# Patient Record
Sex: MALE | Race: ASIAN | NOT HISPANIC OR LATINO | URBAN - METROPOLITAN AREA
[De-identification: names, ages, dates, MRNs, and addresses within clinical notes are randomized per-mention and may not be internally consistent; named-entity substitution may affect disease eponyms.]

---

## 2020-03-21 ENCOUNTER — EMERGENCY (EMERGENCY)
Facility: HOSPITAL | Age: 45
LOS: 1 days | Discharge: HOME | End: 2020-03-21
Attending: EMERGENCY MEDICINE
Payer: COMMERCIAL

## 2020-03-21 VITALS
WEIGHT: 164.91 LBS | OXYGEN SATURATION: 98 % | HEART RATE: 95 BPM | RESPIRATION RATE: 16 BRPM | TEMPERATURE: 99 F | HEIGHT: 66 IN | DIASTOLIC BLOOD PRESSURE: 88 MMHG | SYSTOLIC BLOOD PRESSURE: 137 MMHG

## 2020-03-21 PROCEDURE — 99284 EMERGENCY DEPT VISIT MOD MDM: CPT

## 2020-03-21 PROCEDURE — 99282 EMERGENCY DEPT VISIT SF MDM: CPT

## 2020-03-21 RX ORDER — POLYETHYLENE GLYCOL 3350 17 G/17G
17 POWDER, FOR SOLUTION ORAL
Qty: 510 | Refills: 0
Start: 2020-03-21 | End: 2020-04-19

## 2020-03-21 RX ORDER — MULTIVIT WITH MIN/MFOLATE/K2 340-15/3 G
300 POWDER (GRAM) ORAL
Qty: 300 | Refills: 0
Start: 2020-03-21

## 2020-03-21 NOTE — ED PROVIDER NOTE - CLINICAL SUMMARY MEDICAL DECISION MAKING FREE TEXT BOX
44 yr old male with no hx presents to ed c/o constipation x 4 days.  straining and noticed streak of blood. hx of constipation. no ac use. no fever, no chills, no n/v.  abd cramping. no hx of colon ca. no fever, no travel or alcohol use. no rectal instrumentation  \  rx miralax, mag citrate, fleet enema - avoid straining. eat veggies and hydrate, see gi

## 2020-03-21 NOTE — ED PROVIDER NOTE - PATIENT PORTAL LINK FT
You can access the FollowMyHealth Patient Portal offered by [facility] by registering at the following website: [email or web address]. By joining [facility]’s FollowMyHealth portal, you will also be able to view your health information using other applications (apps) compatible with our system.

## 2020-03-21 NOTE — ED ADULT NURSE NOTE - CHIEF COMPLAINT QUOTE
c/o constipation x 3 days with LUQ stomach pain, i've been drinking prune juice and when I pooped I saw some blood around 4pm

## 2020-03-21 NOTE — ED PROVIDER NOTE - OBJECTIVE STATEMENT
44 yr old male with no hx presents to ed c/o constipation x 4 days.  straining and noticed streak of blood. hx of constipation. no ac use. no fever, no chills, no n/v.  abd cramping. no hx of colon ca. no fever, no travel or alcohol use. no rectal instrumentation

## 2020-08-09 PROBLEM — Z00.00 ENCOUNTER FOR PREVENTIVE HEALTH EXAMINATION: Status: ACTIVE | Noted: 2020-08-09

## 2020-08-14 ENCOUNTER — APPOINTMENT (OUTPATIENT)
Dept: GASTROENTEROLOGY | Facility: CLINIC | Age: 45
End: 2020-08-14
Payer: COMMERCIAL

## 2020-08-14 VITALS
BODY MASS INDEX: 24.48 KG/M2 | WEIGHT: 156 LBS | SYSTOLIC BLOOD PRESSURE: 130 MMHG | TEMPERATURE: 98.2 F | HEIGHT: 67 IN | OXYGEN SATURATION: 95 % | HEART RATE: 67 BPM | DIASTOLIC BLOOD PRESSURE: 85 MMHG

## 2020-08-14 DIAGNOSIS — R19.8 OTHER SPECIFIED SYMPTOMS AND SIGNS INVOLVING THE DIGESTIVE SYSTEM AND ABDOMEN: ICD-10-CM

## 2020-08-14 DIAGNOSIS — Z12.11 ENCOUNTER FOR SCREENING FOR MALIGNANT NEOPLASM OF COLON: ICD-10-CM

## 2020-08-14 DIAGNOSIS — Z83.71 ENCOUNTER FOR SCREENING FOR MALIGNANT NEOPLASM OF COLON: ICD-10-CM

## 2020-08-14 DIAGNOSIS — Z82.49 FAMILY HISTORY OF ISCHEMIC HEART DISEASE AND OTHER DISEASES OF THE CIRCULATORY SYSTEM: ICD-10-CM

## 2020-08-14 DIAGNOSIS — R73.03 PREDIABETES.: ICD-10-CM

## 2020-08-14 DIAGNOSIS — Z78.9 OTHER SPECIFIED HEALTH STATUS: ICD-10-CM

## 2020-08-14 DIAGNOSIS — Z87.39 PERSONAL HISTORY OF OTHER DISEASES OF THE MUSCULOSKELETAL SYSTEM AND CONNECTIVE TISSUE: ICD-10-CM

## 2020-08-14 DIAGNOSIS — Z91.89 OTHER SPECIFIED PERSONAL RISK FACTORS, NOT ELSEWHERE CLASSIFIED: ICD-10-CM

## 2020-08-14 PROCEDURE — 99203 OFFICE O/P NEW LOW 30 MIN: CPT

## 2020-08-14 NOTE — HISTORY OF PRESENT ILLNESS
[None] : had no significant interval events [Nausea] : denies nausea [Vomiting] : denies vomiting [Yellow Skin Or Eyes (Jaundice)] : denies jaundice [Rectal Pain] : denies rectal pain [Wt Loss ___ Lbs] : recent [unfilled] ~Upound(s) weight loss [Heartburn] : heartburn [Diarrhea] : diarrhea [Constipation] : constipation [Abdominal Pain] : abdominal pain [GERD] : gastroesophageal reflux disease [Abdominal Swelling] : abdominal swelling [Wt Gain ___ Lbs] : no recent weight gain [Hiatus Hernia] : no hiatus hernia [Peptic Ulcer Disease] : no peptic ulcer disease [Pancreatitis] : no pancreatitis [Cholelithiasis] : no cholelithiasis [Kidney Stone] : no kidney stone [Inflammatory Bowel Disease] : no inflammatory bowel disease [Diverticulitis] : no diverticulitis [Irritable Bowel Syndrome] : no irritable bowel syndrome [Alcohol Abuse] : no alcohol abuse [Malignancy] : no malignancy [Abdominal Surgery] : no abdominal surgery [Appendectomy] : no appendectomy [Cholecystectomy] : no cholecystectomy [de-identified] : The patient is a 44-year-old [facility] male with past medical history significant for hypercholesterolemia and borderline diabetes mellitus who was referred to my office by Dr. Yeimy Barnard for abdominal pain, dyspepsia and gastroesophageal reflux disease. The patient also admits to having alternating diarrhea/constipation, change in bowel habits, change in caliber of stool and lower GI bleeding.  The patient haso has a family history of colonic polyps.  I was asked to render an opinion for consultation for the above complaints.   The patient states that he is feeling uncomfortable x 5 months.  The patient complains of abdominal pain.  The patient describes the abdominal pain as a crampy, intermittent midepigastric and left upper quadrant discomfort that occasionally radiates to the back.  The abdominal pain is worse with meals and improves with passing gas or having a bowel movement.  The abdominal pain is described as being mild in nature.  The abdominal pain occurs at night and in the morning.  The abdominal pain can occur at any time.   The abdominal pain never has awakened the patient from sleep.  The abdominal pain is not relieved with any medications.  The abdominal pain is associated with abdominal gas and bloating.  The patient denies any nausea or vomiting.  The patient complains of gastroesophageal reflux disease but denies any dysphagia. The gastroesophageal reflux disease is worse after meals and late at night and in the early morning. The patient denied taking medications for the gastroesophageal reflux disease such as proton pump inhibitors, H2 blockers or antacids.  The patient denies any atypical chest pain, shortness of breath or palpitations.  The patient denies any diaphoresis. The patient complains of alternating diarrhea/constipation.  The patient has 1 bowel movement every 1 to 3 days.  The patient complains of a change in bowel habits.  The patient complains of a change in caliber of stool.   The diarrhea is described as soft to watery in nature.  The patient admits to having occasional mucus discharge with the bowel movements.  The patient complains of lower GI bleeding but denies any melena or hematemesis.  The lower GI bleeding is associated with diarrhea and constipation and internal hemorrhoids.  The patient denies any rectal pain or rectal pruritus. The patient complains of weight loss but denies any anorexia.  The patient admits to losing 10 pounds over the past 5 months.  He denies any fevers or chills.  The patient denies any jaundice or pruritus.  The patient complains of occasional lower back pain.  The patient denies ever having a prior upper endoscopy and colonoscopy performed by another gastroenterologist.  The patient admits to a family history of GI problems.  The patient’s maternal aunt had ?liver vs. gallbladder issue that required surgery.  The patient’s sister had a history of colonic polyps age 45. [de-identified] : (+) prior smoking PRN stopped x 10 years, (-) ETOH, (-) IVDA\par

## 2020-08-14 NOTE — REVIEW OF SYSTEMS
[Abdominal Pain] : abdominal pain [Constipation] : constipation [Diarrhea] : diarrhea [Heartburn] : heartburn [Negative] : Heme/Lymph

## 2020-08-14 NOTE — ASSESSMENT
[FreeTextEntry1] : Abdominal Pain: The patient complains of abdominal pain. The patient is to avoid nonsteroidal anti-inflammatory drugs and aspirin. I recommend a trial of Pantoprazole 40 mg once a day for 3 months for the symptoms.  \par Dyspepsia: The patient complains of dyspeptic symptoms.  The patient was advised to abide by an anti-gas diet.  The patient was given a pamphlet for anti-gas.  The patient and I reviewed the anti-gas diet at length. The patient is to start on a trial of Phazyme one tablet 3 times a day p.r.n. abdominal pain and gas.\par GERD: The patient was advised to avoid late-night meals and dietary indiscretions.  The patient was advised to avoid fried and fatty foods.  The patient was advised to abide by an anti-GERD diet. The patient was given a pamphlet for anti-GERD.  The patient and I reviewed the anti-GERD diet at length. I recommend a trial of Pantoprazole 40 mg once a day x 3 months for the symptoms.\par Alternating Diarrhea/Constipation: The patient complains of alternating diarrhea/constipation.  I recommend a low residue diet. The patient is to avoid fiber supplementation. The patient is to consider starting a trial of a probiotic such as Align once a day.   If the symptoms persist, the patient may require sending stool studies for C+S, O+P x3, and C. difficile to assess for an infectious etiology of the diarrhea.     The patient agreed and will follow-up to reassess the symptoms.\par Rectal Bleeding: The patient had episodes of rectal bleeding.  The etiology of the rectal bleeding is unclear.  I recommend a trial of Anusol HC suppositories one per rectum QHS and Anusol HC 2.5% cream apply to affected area twice a day PRN hemorrhoidal bleeding or pain.  I recommend a trial of Calmoseptine cream apply to affected area twice a day for rectal discomfort. I recommend Tucks pads for the hemorrhoids.  I recommend starting Sitz baths twice a day for the hemorrhoids.  I recommend avoid wearing tight underwear and use boxers. I recommend avoid touching the perineal area. If the symptoms persist, I recommend a surgical evaluation for possible band ligation of the hemorrhoids with Dr. Trent Patel for possible surgery. I recommend a colonoscopy to assess the site of bleeding. The patient was told of the risks and benefits of the procedure.  The patient was told of the risks of perforation, emergency surgery, bleeding, infections and missed lesions.  The patient agreed and will schedule for the procedure. The patient is to be n.p.o. after midnight and bowel prep was given.  The patient is to return for the procedure.\par Family History of colonic polyps: The patient has a family history of colonic polyps.  I recommend a colonoscopy to assess for colonic polyp. The patient was told of the risks and benefits of the procedure.  The patient was told of the risks of bleeding, infection, perforation, emergency surgery and missed lesions.  There was also a discussion of alternative tests.  The patient agreed and will schedule for the procedure.  The patient is to be n.p.o. after midnight and bowel prep was given.  The patient is to return for the procedure.\par Upper Endoscopy and Colonoscopy: I recommend an upper endoscopy and colonoscopy to assess the symptoms.  The patient was told of the risks and benefits of the procedure.  The patient was told of the risks of perforation, emergency surgery, bleeding, infections and missed lesions.  The patient agreed and will schedule for the procedure.  The patient is to be n.p.o. after midnight and bowel prep was given.  The patient is to return for the procedure. \par Follow-up: The patient is to follow-up in the office in 4 weeks to reassess the symptoms. The patient was told to call the office if any further problems.\par \par \par \par

## 2020-08-17 LAB — HEMOCCULT STL QL: NEGATIVE

## 2020-08-29 ENCOUNTER — APPOINTMENT (OUTPATIENT)
Dept: DISASTER EMERGENCY | Facility: CLINIC | Age: 45
End: 2020-08-29

## 2020-08-30 LAB — SARS-COV-2 N GENE NPH QL NAA+PROBE: NOT DETECTED

## 2020-09-01 ENCOUNTER — APPOINTMENT (OUTPATIENT)
Dept: GASTROENTEROLOGY | Facility: HOSPITAL | Age: 45
End: 2020-09-01

## 2020-09-01 ENCOUNTER — RESULT REVIEW (OUTPATIENT)
Age: 45
End: 2020-09-01

## 2020-09-01 ENCOUNTER — OUTPATIENT (OUTPATIENT)
Dept: OUTPATIENT SERVICES | Facility: HOSPITAL | Age: 45
LOS: 1 days | End: 2020-09-01
Payer: COMMERCIAL

## 2020-09-01 DIAGNOSIS — R10.13 EPIGASTRIC PAIN: ICD-10-CM

## 2020-09-01 DIAGNOSIS — Z91.89 OTHER SPECIFIED PERSONAL RISK FACTORS, NOT ELSEWHERE CLASSIFIED: ICD-10-CM

## 2020-09-01 PROCEDURE — 88305 TISSUE EXAM BY PATHOLOGIST: CPT | Mod: 26

## 2020-09-01 PROCEDURE — 45378 DIAGNOSTIC COLONOSCOPY: CPT

## 2020-09-01 PROCEDURE — 43239 EGD BIOPSY SINGLE/MULTIPLE: CPT

## 2020-09-01 PROCEDURE — 88312 SPECIAL STAINS GROUP 1: CPT | Mod: 26

## 2020-09-01 PROCEDURE — 45380 COLONOSCOPY AND BIOPSY: CPT

## 2020-09-01 PROCEDURE — 88312 SPECIAL STAINS GROUP 1: CPT

## 2020-09-01 PROCEDURE — 88305 TISSUE EXAM BY PATHOLOGIST: CPT

## 2020-09-01 NOTE — CHART NOTE - NSCHARTNOTEFT_GEN_A_CORE
Esophagogastroduodenoscopy Report:    Indication:             Abdominal pain, dyspepsia, GERD, diarrhea  Referring MD:       Dr. Ana Barnard  Instrument:  #        [id number]  Anesthesia:            MAC    Consent:  Informed consent was obtained from the patient after providing any opportunity for questions  Procedure: The gastroscope was gently passed through the incisoral orifice into the oral cavity and under direct visualization the esophagus was intubated. The endoscope was passed down the esophagus, through the stomach and into proximal jejunum. Color, texture, mucosa and anatomy of the esophagus, stomach, and duodenum were carefully examined with the scope. The patient tolerated the procedure well. After completion of the examination, the patient was transferred to the recovery room.     Procedure: Upper endoscopy and biopsies    Preparation: NPO     Findings:     Oropharynx:	  Normal appearing oropharynx.  Esophagus:	  Mild mid to distal erythema with linear erosions suggestive of esophagitis but no ulcers noted.  Biopsy taken.  EG-junction:	  Normal appearing E-G junction at 37 cm. (+) Small hiatal hernia noted. No ulcers, erosions or erythema noted.  Cardia:	                Mild diffuse erythema suggestive of gastritis but no ulcers or erosions noted.  (+) Clear liquid suctioned.  Body:	                Mild diffuse erythema suggestive of gastritis but no ulcers or erosions noted. Biopsy taken.  Antrum:	                Mild diffuse erythema suggestive of gastritis but no ulcers or erosions noted. Biopsy taken.  Pylorus:	                Patulous pylorus but no ulcers, erosions or erythema noted.     Duodenal Bulb:        Normal appearing duodenal mucosa with no ulcers, erosions or erythema noted. Biopsy taken.  2nd portion:	  Normal appearing duodenal mucosa with no ulcers, erosions or erythema noted.  Biopsy taken.  3rd portion:	  Not visualized.      EBL:0    Impression: 1. Small hiatal hernia 2. Mild mid to distal esophagitis with linear erosions 3. Mild diffuse gastritis    Plan:    1. Avoid late-night meals and dietary indiscretions.  2. Avoid fried and fatty foods.  3. Avoid nonsteroidal anti-inflammatory drugs and aspirin.  4. Will check path results.  5. Recommend a trial of pantoprazole 40 mg once a day for 3 months.  6. Followup in office in 4 weeks to reassess the symptoms and discuss the findings.      Procedure Start Time:  2:44 pm  Procedure End Time:    2:51 pm      Attending:       Graeme Corrales M.D.

## 2020-09-01 NOTE — CHART NOTE - NSCHARTNOTEFT_GEN_A_CORE
Colonoscopy Report:    Indication:          Abdominal pain, Alternating diarrhea/constipation, Lower GI bleeding, Family Hx. of colonic polyps  Referring MD:    Dr. Ana Deutsch  Instrument:        # [id number]  Anesthesia:         MAC    Consent:  Informed consent was obtained from the patient after providing any opportunity for questions  Procedure: After placing the patient in the left lateral decubitus position, the colonoscope was gently inserted into the rectum and advanced to the terminal ileum and cecum. Color, texture, mucosa, and anatomy of the colon were carefully examined with the scope. The patient tolerated the procedure well. After completion of the exam, the patient was transferred to the recovery room.     Procedure: Colonoscopy and biopsies    Preparation: Nulytely (Adequate Prep)    Findings:     Anal Canal:	    Normal appearing anal canal. (+) Small internal hemorrhoids noted on retroflex view.  Rectum:	                  Normal appearing rectal mucosa with no polyps, masses, diverticulosis, AVMs or proctitis noted.  Sigmoid Colon:  	    Normal appearing colonic mucosa with no polyps, masses, diverticulosis, AVMs or colitis noted. Long and tortuous colon.   Descending Colon:     Normal appearing colonic mucosa with no polyps, masses, diverticulosis, AVMs or colitis noted.  Splenic Flexure:	     Normal appearing colonic mucosa with no polyps, masses, diverticulosis, AVMs or colitis noted.  Transverse Colon:       Normal appearing colonic mucosa with no polyps, masses, diverticulosis, AVMs or colitis noted.  Hepatic Flexure:	     Normal appearing colonic mucosa with no polyps, masses, diverticulosis, AVMs or colitis noted.  Ascending Colon: 	     Normal appearing colonic mucosa with no polyps, masses, diverticulosis, AVMs or colitis noted.  Cecum:	                   Normal appearing colonic mucosa with no polyps, masses, diverticulosis, AVMs or colitis noted.  Biopsy taken.   Ileo-cecal Valve:	     Normal appearing ileo-cecal valve mucosa with no polyps, masses, AVMs or ileo-colitis noted.  Ileum:                         Normal ileal mucosa with no polyps, masses, diverticulosis, AVMs or ileitis noted.  Biopsy taken.  	    EBL:0    Impression:  1. Normal but long and tortuous colon 2. Small internal hemorrhoids.    A/P:     1. Recommend a low residue diet  2. Consider a trial of Anusol HC suppositories one per rectum q.h.s. and Anusol HC 2.5% cream apply to affected area twice a day p.r.n. hemorrhoidal bleeding or pain.  3. Will check path results.  4. Recommend a repeat colonoscopy in 10 years to reassess for colonic polyps and colitis pending path results unless symptomatic.  5. Followup in the office in 4 weeks to reassess the symptoms and discuss the findings.      Procedure Start Time:   2:53 pm  Cecum Reached Time:   2:56 pm (3 minutes)  Procedure End Time:     3:03 pm  Total Withdrawal Time:  7 minutes      Attending:       Graeme Corrales M.D.

## 2020-09-04 LAB — SURGICAL PATHOLOGY STUDY: SIGNIFICANT CHANGE UP

## 2020-09-30 ENCOUNTER — APPOINTMENT (OUTPATIENT)
Dept: GASTROENTEROLOGY | Facility: CLINIC | Age: 45
End: 2020-09-30
Payer: COMMERCIAL

## 2020-09-30 VITALS
OXYGEN SATURATION: 96 % | BODY MASS INDEX: 25.74 KG/M2 | SYSTOLIC BLOOD PRESSURE: 130 MMHG | DIASTOLIC BLOOD PRESSURE: 83 MMHG | WEIGHT: 164 LBS | HEIGHT: 67 IN | TEMPERATURE: 98.1 F | HEART RATE: 64 BPM

## 2020-09-30 PROCEDURE — 99213 OFFICE O/P EST LOW 20 MIN: CPT

## 2020-09-30 NOTE — ASSESSMENT
[FreeTextEntry1] : Abdominal Pain: The patient complains of abdominal pain. The patient is to avoid nonsteroidal anti-inflammatory drugs and aspirin. I recommend a trial of Pantoprazole 40 mg once a day for 3 months for the symptoms.  \par Dyspepsia: The patient complains of dyspeptic symptoms.  The patient was advised to abide by an anti-gas diet.  The patient was given a pamphlet for anti-gas.  The patient and I reviewed the anti-gas diet at length. The patient is to start on a trial of Phazyme one tablet 3 times a day p.r.n. abdominal pain and gas.\par GERD: The patient was advised to avoid late-night meals and dietary indiscretions.  The patient was advised to avoid fried and fatty foods.  The patient was advised to abide by an anti-GERD diet. The patient was given a pamphlet for anti-GERD.  The patient and I reviewed the anti-GERD diet at length. I recommend a trial of Pantoprazole 40 mg once a day x 3 months for the symptoms.\par Eosinophilic Esophagitis: The patient had a history of findings suggestive of eosinophilic esophagitis on recent upper endoscopy.  I recommend a trial of pantoprazole 40 mg once of the possible eosinophilic esophagitis.  If the symptoms persist, the patient may require a trial of steroids (Fluticasone proprionate 220 mcg/puff twice a day (swallowed without a spacer and the mouth is rinsed with water after use) for 6 weeks) for the eosinophilic esophagitis.  The patient may also require evaluation for possible food allergies for a cause of the eosinophilic esophagitis.  The patient and I had a long discussion regarding the risks of eosinophilic esophagitis.  The patient was told of the possibility of dysphagia, chest pain, gastroesophageal reflux disease and abdominal pain.  I recommend avoid late night meals and dietary indiscretions. The patient was advised to avoid NSAIDs and Aspirin.  \par Reflux Esophagitis:  The patient had reflux esophagitis noted on prior upper endoscopy.  The patient was advised to avoid late-night meals and dietary indiscretions.  The patient was advised to avoid fried and fatty foods.  The patient was advised to abide by an anti-GERD diet. The patient was given a pamphlet for anti-GERD.  The patient and I reviewed the anti-GERD diet at length\par Hiatal Hernia:  The patient was advised to avoid late-night meals and dietary indiscretions.  The patient was advised to avoid fried and fatty foods.  The patient was advised to abide by an anti-GERD diet. The patient was given a pamphlet for anti-GERD.  The patient and I reviewed the anti-GERD diet at length. I recommend a trial of Pantoprazole 40 mg once a day for 3 months for the symptoms.\par Gastritis: The patient has a history of gastritis. The patient is to avoid nonsteroidal anti-inflammatory drugs and aspirin. I recommend a trial of pantoprazole 40 mg once a day for 3 months for the symptoms. \par H. pylori Gastritis: The patient was found to have H. pylori gastritis on recent upper endoscopy.  I recommend a trial of Clarithromycin 500 mg 2 times a day, Amoxicilin 500mg twice a day and Omeprazole 40 mg twice a day for 14 days for H. pylori eradication.  The patient is to return in 3 to 6 months for H. pylori breath test to assess for eradication of the bacteria. \par Intestinal Metaplasia of the Stomach:  The patient was found to have intestinal metaplasia of the stomach on recent upper endoscopy.  The findings of intestinal metaplasia of the stomach have an increased risk of gastric cancer.  Recent biopsies did not reveal dysplasia.  I recommend a repeat upper endoscopy with mapping in 3 years to reassess for intestinal metaplasia and dysplasia unless symptomatic.  The patient is aware of the increased risk of intestinal metaplasia and progression to stomach cancer.  The patient agrees to follow-up.\par Internal Hemorrhoids: The patient is to be started on a trial of Anusol H. C. suppositories one per rectum nightly and Anusol HC2 .5% cream apply to affected area twice a day p.r.n. hemorrhoidal bleeding or pain. \par Family History of Colonic Polyps: The patient has a family history of colonic polyps.  I recommend a repeat colonoscopy in 5 years to reassess for colonic polyps unless symptomatic.  The patient agreed and will follow up for the procedure. \par Follow-up: The patient is to follow-up in the office in 2 month to reassess the symptoms. The patient was told to call the office if any further problems.\par \par \par \par

## 2020-11-09 ENCOUNTER — APPOINTMENT (OUTPATIENT)
Dept: GASTROENTEROLOGY | Facility: CLINIC | Age: 45
End: 2020-11-09
Payer: COMMERCIAL

## 2020-11-09 VITALS
DIASTOLIC BLOOD PRESSURE: 81 MMHG | BODY MASS INDEX: 26.53 KG/M2 | WEIGHT: 169 LBS | SYSTOLIC BLOOD PRESSURE: 130 MMHG | OXYGEN SATURATION: 94 % | TEMPERATURE: 97.2 F | HEART RATE: 78 BPM | HEIGHT: 67 IN

## 2020-11-09 PROCEDURE — 99214 OFFICE O/P EST MOD 30 MIN: CPT

## 2020-11-09 PROCEDURE — 99072 ADDL SUPL MATRL&STAF TM PHE: CPT

## 2020-11-09 NOTE — ASSESSMENT
[FreeTextEntry1] : Abdominal Pain: The patient complains of abdominal pain. The patient is to avoid nonsteroidal anti-inflammatory drugs and aspirin. I recommend a trial of Pantoprazole 40 mg once a day for 3 months for the symptoms.  \par Dyspepsia: The patient complains of dyspeptic symptoms.  The patient was advised to abide by an anti-gas diet.  The patient was given a pamphlet for anti-gas.  The patient and I reviewed the anti-gas diet at length. The patient is to start on a trial of Phazyme one tablet 3 times a day p.r.n. abdominal pain and gas.\par GERD: The patient was advised to avoid late-night meals and dietary indiscretions.  The patient was advised to avoid fried and fatty foods.  The patient was advised to abide by an anti-GERD diet. The patient was given a pamphlet for anti-GERD.  The patient and I reviewed the anti-GERD diet at length. I recommend a trial of Pantoprazole 40 mg once a day x 3 months for the symptoms.\par Diarrhea: The patient complains of diarrhea.  I recommend a low residue diet. The patient is to avoid fiber supplementation. The patient is to consider starting a trial of a probiotic such as Align once a day.   If the symptoms persist, the patient may require sending stool studies for C+S, O+P x3, and C. difficile to assess for an infectious etiology of the diarrhea.   The patient agreed and will follow-up to reassess the symptoms.  \par Eosinophilic Esophagitis: The patient had a history of findings suggestive of eosinophilic esophagitis on recent upper endoscopy. I recommend a trial of pantoprazole 40 mg once of the possible eosinophilic esophagitis. If the symptoms persist, the patient may require a trial of steroids (Fluticasone proprionate 220 mcg/puff twice a day (swallowed without a spacer and the mouth is rinsed with water after use) for 6 weeks) for the eosinophilic esophagitis. The patient may also require evaluation for possible food allergies for a cause of the eosinophilic esophagitis. The patient and I had a long discussion regarding the risks of eosinophilic esophagitis. The patient was told of the possibility of dysphagia, chest pain, gastroesophageal reflux disease and abdominal pain. I recommend avoid late night meals and dietary indiscretions. The patient was advised to avoid NSAIDs and Aspirin. \par Reflux Esophagitis: The patient had reflux esophagitis noted on prior upper endoscopy. The patient was advised to avoid late-night meals and dietary indiscretions. The patient was advised to avoid fried and fatty foods. The patient was advised to abide by an anti-GERD diet. The patient was given a pamphlet for anti-GERD. The patient and I reviewed the anti-GERD diet at length\par Hiatal Hernia: The patient was advised to avoid late-night meals and dietary indiscretions. The patient was advised to avoid fried and fatty foods. The patient was advised to abide by an anti-GERD diet. The patient was given a pamphlet for anti-GERD. The patient and I reviewed the anti-GERD diet at length. I recommend a trial of Pantoprazole 40 mg once a day for 3 months for the symptoms.\par Gastritis: The patient has a history of gastritis. The patient is to avoid nonsteroidal anti-inflammatory drugs and aspirin. I recommend a trial of pantoprazole 40 mg once a day for 3 months for the symptoms. \par H. pylori Gastritis: The patient was found to have H. pylori gastritis on recent upper endoscopy. The patient complered a trial of Clarithromycin 500 mg 2 times a day, Amoxicilin 500mg twice a day and Omeprazole 40 mg twice a day for 14 days for H. pylori eradication. I recommend an H. pylori breath test to assess for eradication of the bacteria. \par Intestinal Metaplasia of the Stomach: The patient was found to have intestinal metaplasia of the stomach on recent upper endoscopy. The findings of intestinal metaplasia of the stomach have an increased risk of gastric cancer. Recent biopsies did not reveal dysplasia. I recommend a repeat upper endoscopy with mapping in 3 years to reassess for intestinal metaplasia and dysplasia unless symptomatic. The patient is aware of the increased risk of intestinal metaplasia and progression to stomach cancer. The patient agrees to follow-up.\par Internal Hemorrhoids: The patient is to be started on a trial of Anusol H. C. suppositories one per rectum nightly and Anusol HC2.5% cream apply to affected area twice a day p.r.n. hemorrhoidal bleeding or pain. \par Family History of Colonic Polyps: The patient has a family history of colonic polyps. I recommend a repeat colonoscopy in 5 years to reassess for colonic polyps unless symptomatic. The patient agreed and will follow up for the procedure. \par Follow-up: The patient is to follow-up in the office in 6 months to reassess the symptoms. The patient was told to call the office if any further problems.\par \par \par \par \par

## 2020-11-09 NOTE — HISTORY OF PRESENT ILLNESS
[None] : had no significant interval events [Nausea] : denies nausea [Vomiting] : denies vomiting [Constipation] : denies constipation [Yellow Skin Or Eyes (Jaundice)] : denies jaundice [Rectal Pain] : denies rectal pain [Heartburn] : heartburn [Diarrhea] : diarrhea [Abdominal Pain] : abdominal pain [Abdominal Swelling] : abdominal swelling [GERD] : gastroesophageal reflux disease [Hiatus Hernia] : hiatus hernia [Wt Gain ___ Lbs] : no recent weight gain [Wt Loss ___ Lbs] : no recent weight loss [Peptic Ulcer Disease] : no peptic ulcer disease [Pancreatitis] : no pancreatitis [Cholelithiasis] : no cholelithiasis [Kidney Stone] : no kidney stone [Inflammatory Bowel Disease] : no inflammatory bowel disease [Irritable Bowel Syndrome] : no irritable bowel syndrome [Diverticulitis] : no diverticulitis [Alcohol Abuse] : no alcohol abuse [Malignancy] : no malignancy [Abdominal Surgery] : no abdominal surgery [Appendectomy] : no appendectomy [Cholecystectomy] : no cholecystectomy [de-identified] : The patient states that he is feeling uncomfortable x 1 month. The patient denies any jaundice or pruritus.  The patient complains of occasional lower back pain. The patient complains of abdominal pain.  The patient describes the abdominal pain as a sharp, intermittent upper abdominal discomfort that is nonradiating in nature.  The abdominal pain is worse with stress.  The abdominal pain is worse with meals and improves with passing gas or having a bowel movement.  The abdominal pain is described as being mild in nature.  The abdominal pain occurs at night and in the morning.  The abdominal pain can occur at any time.   The abdominal pain has never awakened the patient from sleep.  The abdominal pain is not relieved with medication.  The abdominal pain is associated with abdominal gas and bloating.  The patient denies any nausea or vomiting.  The patient complains of occasional gastroesophageal reflux disease but denies any dysphagia. The patient denies taking medications for the gastroesophageal reflux disease.  The gastroesophageal reflux disease is worse after meals and late at night and in the early morning. The patient denies any atypical chest pain, shortness of breath or palpitations.  The patient denies any diaphoresis. The patient complains of diarrhea but denies any constipation.  The patient has 1 to 2 bowel movements a day.  The patient complains of a change in bowel habits.  The patient complains of a change in caliber of stool.  The diarrhea is described as soft to watery in nature.  The patient denies having mucus discharge with the bowel movements.  The patient denies any bright red blood per rectum, melena or hematemesis.  The patient denies any rectal pain or rectal pruritus.  The patient denies any weight loss or anorexia.  He denies any fevers or chills.  The patient had an upper endoscopy and colonoscopy to the cecum and terminal ileum performed at the [facility] endoscopy suite on September 1, 2020. The upper endoscopy was performed up to the level of the second portion of the duodenum. The upper endoscopy revealed mild mid to distal esophagitis with linear erosions, a small hiatal hernia and mild diffuse gastritis. Biopsies were taken of the distal esophagus, antrum, body of stomach and duodenum to assess for esophagitis, gastritis and duodenitis. The pathology revealed distal esophagus with fragments of squamous esophageal epithelium carpeted with eosinophils too numerous to count suggestive of eosinophilic esophagitis with no evidence of fungal microorganisms, gastric antral mucosa with chronic moderate active gastritis with focal incomplete intestinal metaplasia that was positive for Helicobacter pylori, gastric antral and body mucosa with chronic moderate active gastritis that was positive for Helicobacter pylori and unremarkable duodenal mucosa with preserved villous architecture with no evidence of parasites or intraepithelial lymphocytes. The colonoscopy to the terminal ileum revealed a normal but long and tortuous colon and small internal hemorrhoids. Random biopsies were taken of the terminal ileum and cecum to assess for ileitis and microscopic colitis. There were no other polyps, masses, diverticulosis, AVMs or colitis noted. The pathology revealed unremarkable ileal mucosa, and unremarkable colonic mucosa with preserved crypt architecture and marked acute and chronic inflammation with cryptitis. The patient tolerated the procedures well. The patient admits to a family history of GI problems. The patient’s maternal aunt had ?liver vs. gallbladder issue that required surgery. The patient’s sister had a history of colonic polyps age 45.  [de-identified] : (-) smoking, (-) ETOH, (-) IVDA\par

## 2020-11-16 LAB — UREA BREATH TEST QL: NEGATIVE

## 2020-12-23 PROBLEM — Z12.11 ENCOUNTER FOR COLONOSCOPY IN PATIENT WITH FAMILY HISTORY OF COLON POLYPS: Status: RESOLVED | Noted: 2020-08-14 | Resolved: 2020-12-23

## 2021-01-06 ENCOUNTER — APPOINTMENT (OUTPATIENT)
Dept: GASTROENTEROLOGY | Facility: CLINIC | Age: 46
End: 2021-01-06
Payer: COMMERCIAL

## 2021-01-06 VITALS
DIASTOLIC BLOOD PRESSURE: 82 MMHG | HEART RATE: 68 BPM | HEIGHT: 67 IN | OXYGEN SATURATION: 99 % | BODY MASS INDEX: 27.94 KG/M2 | TEMPERATURE: 97.2 F | WEIGHT: 178 LBS | SYSTOLIC BLOOD PRESSURE: 121 MMHG

## 2021-01-06 PROCEDURE — 99072 ADDL SUPL MATRL&STAF TM PHE: CPT

## 2021-01-06 PROCEDURE — 99214 OFFICE O/P EST MOD 30 MIN: CPT

## 2021-01-06 NOTE — HISTORY OF PRESENT ILLNESS
[None] : had no significant interval events [Nausea] : denies nausea [Vomiting] : denies vomiting [Constipation] : denies constipation [Yellow Skin Or Eyes (Jaundice)] : denies jaundice [Rectal Pain] : denies rectal pain [Wt Gain ___ Lbs] : recent [unfilled] ~Upound(s) weight gain [Heartburn] : heartburn [Diarrhea] : diarrhea [Abdominal Pain] : abdominal pain [Abdominal Swelling] : abdominal swelling [GERD] : gastroesophageal reflux disease [Hiatus Hernia] : hiatus hernia [Wt Loss ___ Lbs] : no recent weight loss [Peptic Ulcer Disease] : no peptic ulcer disease [Pancreatitis] : no pancreatitis [Cholelithiasis] : no cholelithiasis [Kidney Stone] : no kidney stone [Inflammatory Bowel Disease] : no inflammatory bowel disease [Irritable Bowel Syndrome] : no irritable bowel syndrome [Diverticulitis] : no diverticulitis [Alcohol Abuse] : no alcohol abuse [Malignancy] : no malignancy [Abdominal Surgery] : no abdominal surgery [Appendectomy] : no appendectomy [Cholecystectomy] : no cholecystectomy [de-identified] : The patient states that he is feeling uncomfortable. The patient denies any jaundice or pruritus.  The patient complains of occasional lower back pain. The patient complains of occasional abdominal pain.  The patient describes the abdominal pain as a sharp, intermittent left upper quadrant discomfort that is nonradiating in nature.  The abdominal pain is worse with meals and improves with passing gas or having a bowel movement.  The abdominal pain is described as being mild in nature.  The abdominal pain occurs at night and in the morning.  The abdominal pain can occur at any time.   The abdominal pain has never awakened the patient from sleep.  The abdominal pain is not relieved with medication.  The abdominal pain is associated with abdominal gas and bloating.  The patient denies any nausea or vomiting.  The patient complains of gastroesophageal reflux disease but denies any dysphagia.  The patient denies taking medications for the gastroesophageal reflux disease.  The gastroesophageal reflux disease is worse after meals and late at night and in the early morning. The patient denies any atypical chest pain, shortness of breath or palpitations.  The patient denies any diaphoresis. The patient complains of diarrhea but denies any constipation.  The patient has 1 bowel movement a day.  The patient complains of a change in bowel habits.  The patient complains of a change in caliber of stool.   The diarrhea is described as soft in nature.  The patient admits to having mucus discharge with the bowel movements.  The patient denies any bright red blood per rectum, melena or hematemesis.  The patient denies any rectal pain or rectal pruritus.  The patient denies any weight loss or anorexia.  The patient admits to gaining  10 to 12 pounds over the past 4 weeks.  He denies any fevers or chills.  The patient had an upper endoscopy and colonoscopy to the cecum and terminal ileum performed at the [facility] GI endoscopy suite on September 1, 2020. The upper endoscopy was performed up to the level of the second portion of the duodenum. The upper endoscopy revealed mild mid to distal esophagitis with linear erosions, a small hiatal hernia and mild diffuse gastritis. Biopsies were taken of the distal esophagus, antrum, body of stomach and duodenum to assess for esophagitis, gastritis and duodenitis. The pathology revealed distal esophagus with fragments of squamous esophageal epithelium carpeted with eosinophils too numerous to count suggestive of eosinophilic esophagitis with no evidence of fungal microorganisms, gastric antral mucosa with chronic moderate active gastritis with focal incomplete intestinal metaplasia that was positive for Helicobacter pylori, gastric antral and body mucosa with chronic moderate active gastritis that was positive for Helicobacter pylori and unremarkable duodenal mucosa with preserved villous architecture with no evidence of parasites or intraepithelial lymphocytes.  The patient completed a trial of Clarithromycin 500 mg 2 times a day, Amoxicilin 500mg twice a day and Omeprazole 40 mg twice a day for 14 days for H. pylori eradication. The patient had an H. pylori breath test performed to assess for the eradication of the bacteria. The H. pylori breath test performed on November 9, 2020 was not detected. The colonoscopy to the terminal ileum revealed a normal but long and tortuous colon and small internal hemorrhoids. Random biopsies were taken of the terminal ileum and cecum to assess for ileitis and microscopic colitis. There were no other polyps, masses, diverticulosis, AVMs or colitis noted. The pathology revealed unremarkable ileal mucosa, and unremarkable colonic mucosa with preserved crypt architecture and marked acute and chronic inflammation with cryptitis. The patient tolerated the procedures well. The patient admits to a family history of GI problems. The patient’s maternal aunt had ?liver vs. gallbladder issue that required surgery. The patient’s sister had a history of colonic polyps age 45.  [de-identified] : (-) smoking, (-) ETOH, (-) IVDA\par

## 2021-01-06 NOTE — ASSESSMENT
[FreeTextEntry1] : Abdominal Pain: The patient complains of abdominal pain. The patient is to avoid nonsteroidal anti-inflammatory drugs and aspirin. I recommend restarting a trial of Pantoprazole 40 mg once a day for 3 months for the symptoms. Dyspepsia: The patient complains of dyspeptic symptoms.  The patient was advised to abide by an anti-gas diet.  The patient was given a pamphlet for anti-gas.  The patient and I reviewed the anti-gas diet at length. The patient is to start on a trial of Phazyme one tablet 3 times a day p.r.n. abdominal pain and gas.\par GERD: The patient was advised to avoid late-night meals and dietary indiscretions.  The patient was advised to avoid fried and fatty foods.  The patient was advised to abide by an anti-GERD diet. The patient was given a pamphlet for anti-GERD.  The patient and I reviewed the anti-GERD diet at length. I recommend restarting a trial of Pantoprazole 40 mg once a day x 3 months for the symptoms.\par Diarrhea: The patient complains of diarrhea.  I recommend a low residue diet. The patient is to avoid fiber supplementation. The patient is to consider starting a trial of a probiotic such as Align once a day.   If the symptoms persist, the patient may require sending stool studies for C+S, O+P x3, and C. difficile to assess for an infectious etiology of the diarrhea.  The patient agreed and will follow-up to reassess the symptoms.  \par Eosinophilic Esophagitis: The patient had a history of findings suggestive of eosinophilic esophagitis on recent upper endoscopy. I recommend a trial of pantoprazole 40 mg once of the possible eosinophilic esophagitis. If the symptoms persist, the patient may require a trial of steroids (Fluticasone proprionate 220 mcg/puff twice a day (swallowed without a spacer and the mouth is rinsed with water after use) for 6 weeks) for the eosinophilic esophagitis. The patient may also require evaluation for possible food allergies for a cause of the eosinophilic esophagitis. The patient and I had a long discussion regarding the risks of eosinophilic esophagitis. The patient was told of the possibility of dysphagia, chest pain, gastroesophageal reflux disease and abdominal pain. I recommend avoid late night meals and dietary indiscretions. The patient was advised to avoid NSAIDs and Aspirin. \par Reflux Esophagitis: The patient had reflux esophagitis noted on prior upper endoscopy. The patient was advised to avoid late-night meals and dietary indiscretions. The patient was advised to avoid fried and fatty foods. The patient was advised to abide by an anti-GERD diet. The patient was given a pamphlet for anti-GERD. The patient and I reviewed the anti-GERD diet at length\par Hiatal Hernia: The patient was advised to avoid late-night meals and dietary indiscretions. The patient was advised to avoid fried and fatty foods. The patient was advised to abide by an anti-GERD diet. The patient was given a pamphlet for anti-GERD. The patient and I reviewed the anti-GERD diet at length. I recommend a trial of Pantoprazole 40 mg once a day for 3 months for the symptoms.\par Gastritis: The patient has a history of gastritis. The patient is to avoid nonsteroidal anti-inflammatory drugs and aspirin. I recommend a trial of pantoprazole 40 mg once a day for 3 months for the symptoms. \par H. pylori Gastritis: The patient was found to have H. pylori gastritis on recent upper endoscopy. The patient completed a trial of Clarithromycin 500 mg 2 times a day, Amoxicilin 500mg twice a day and Omeprazole 40 mg twice a day for 14 days for H. pylori eradication. The patient had an H. pylori breath test performed to assess for the eradication of the bacteria. The H. pylori breath test performed on November 9, 2020 was not detected.\par Intestinal Metaplasia of the Stomach: The patient was found to have intestinal metaplasia of the stomach on recent upper endoscopy. The findings of intestinal metaplasia of the stomach have an increased risk of gastric cancer. Recent biopsies did not reveal dysplasia. I recommend a repeat upper endoscopy with mapping in 3 years to reassess for intestinal metaplasia and dysplasia unless symptomatic. The patient is aware of the increased risk of intestinal metaplasia and progression to stomach cancer. The patient agrees to follow-up.\par Internal Hemorrhoids: The patient is to be started on a trial of Anusol H. C. suppositories one per rectum nightly and Anusol HC2.5% cream apply to affected area twice a day p.r.n. hemorrhoidal bleeding or pain. \par Family History of Colonic Polyps: The patient has a family history of colonic polyps. I recommend a repeat colonoscopy in 5 years to reassess for colonic polyps unless symptomatic. The patient agreed and will follow up for the procedure. \par Follow-up: The patient is to follow-up in the office in 3 months to reassess the symptoms. The patient was told to call the office if any further problems.\par \par \par \par \par \par \par \par

## 2021-04-07 ENCOUNTER — APPOINTMENT (OUTPATIENT)
Dept: GASTROENTEROLOGY | Facility: CLINIC | Age: 46
End: 2021-04-07

## 2021-05-10 ENCOUNTER — APPOINTMENT (OUTPATIENT)
Dept: GASTROENTEROLOGY | Facility: CLINIC | Age: 46
End: 2021-05-10
Payer: COMMERCIAL

## 2021-05-10 VITALS
HEIGHT: 67 IN | TEMPERATURE: 97.1 F | SYSTOLIC BLOOD PRESSURE: 142 MMHG | BODY MASS INDEX: 28.41 KG/M2 | WEIGHT: 181 LBS | HEART RATE: 77 BPM | OXYGEN SATURATION: 100 % | DIASTOLIC BLOOD PRESSURE: 76 MMHG

## 2021-05-10 PROCEDURE — 99072 ADDL SUPL MATRL&STAF TM PHE: CPT

## 2021-05-10 PROCEDURE — 99214 OFFICE O/P EST MOD 30 MIN: CPT

## 2021-05-10 NOTE — HISTORY OF PRESENT ILLNESS
[None] : had no significant interval events [Nausea] : denies nausea [Vomiting] : denies vomiting [Diarrhea] : denies diarrhea [Constipation] : denies constipation [Yellow Skin Or Eyes (Jaundice)] : denies jaundice [Rectal Pain] : denies rectal pain [Heartburn] : heartburn [Abdominal Pain] : abdominal pain [Abdominal Swelling] : abdominal swelling [GERD] : gastroesophageal reflux disease [Wt Gain ___ Lbs] : no recent weight gain [Wt Loss ___ Lbs] : no recent weight loss [Hiatus Hernia] : no hiatus hernia [Peptic Ulcer Disease] : no peptic ulcer disease [Cholelithiasis] : no cholelithiasis [Pancreatitis] : no pancreatitis [Kidney Stone] : no kidney stone [Inflammatory Bowel Disease] : no inflammatory bowel disease [Irritable Bowel Syndrome] : no irritable bowel syndrome [Diverticulitis] : no diverticulitis [Alcohol Abuse] : no alcohol abuse [Malignancy] : no malignancy [Abdominal Surgery] : no abdominal surgery [Appendectomy] : no appendectomy [Cholecystectomy] : no cholecystectomy [de-identified] : (-) smoking, (-) ETOH, (-) IVDA\par  [de-identified] : The patient states that he is feeling better. The patient denies any jaundice or pruritus.  The patient complains of occasional left sided lower back pain. The patient complains of abdominal pain.  The patient describes the abdominal pain as a crampy, burning, intermittent left upper quadrant discomfort that occasionally radiates to the back.  The abdominal pain is worse with meals and improves with passing gas or having a bowel movement.  The abdominal pain is described as being mild in nature.  The abdominal pain occurs in the morning.  The abdominal pain can occur at any time.   The abdominal pain has never awakened the patient from sleep.  The abdominal pain is not relieved with medication.  The abdominal pain is associated with abdominal gas and bloating.  The patient complains of occasional nausea but denies any vomiting.  The patient complains of occasional gastroesophageal reflux disease but denies any dysphagia.  The patient denies taking medications for the gastroesophageal reflux disease.  The gastroesophageal reflux disease is worse after meals and late at night and in the early morning. The patient complains of coughing and shortness of breath secondary to allergies but denies any atypical chest pain or palpitations.  The patient denies any diaphoresis. The patient denies any constipation or diarrhea.  The patient has 1 to 2 bowel movements a day.  The patient denies a change in bowel habits.  The patient denies a change in caliber of stool.  The patient denies having mucus discharge with the bowel movements.  The patient denies any bright red blood per rectum, melena or hematemesis.  The patient denies any rectal pain or rectal pruritus.  The patient denies any weight loss or anorexia.  The patient admits to gaining weight recently.  He denies any fevers or chills.  The patient had an upper endoscopy and colonoscopy to the cecum and terminal ileum performed at the [facility] GI endoscopy suite on September 1, 2020. The upper endoscopy was performed up to the level of the second portion of the duodenum. The upper endoscopy revealed mild mid to distal esophagitis with linear erosions, a small hiatal hernia and mild diffuse gastritis. Biopsies were taken of the distal esophagus, antrum, body of stomach and duodenum to assess for esophagitis, gastritis and duodenitis. The pathology revealed distal esophagus with fragments of squamous esophageal epithelium carpeted with eosinophils too numerous to count suggestive of eosinophilic esophagitis with no evidence of fungal microorganisms, gastric antral mucosa with chronic moderate active gastritis with focal incomplete intestinal metaplasia that was positive for Helicobacter pylori, gastric antral and body mucosa with chronic moderate active gastritis that was positive for Helicobacter pylori and unremarkable duodenal mucosa with preserved villous architecture with no evidence of parasites or intraepithelial lymphocytes. The patient completed a trial of Clarithromycin 500 mg 2 times a day, Amoxicilin 500mg twice a day and Omeprazole 40 mg twice a day for 14 days for H. pylori eradication. The patient had an H. pylori breath test performed to assess for the eradication of the bacteria. The H. pylori breath test performed on November 9, 2020 was not detected. The colonoscopy to the terminal ileum revealed a normal but long and tortuous colon and small internal hemorrhoids. Random biopsies were taken of the terminal ileum and cecum to assess for ileitis and microscopic colitis. There were no other polyps, masses, diverticulosis, AVMs or colitis noted. The pathology revealed unremarkable ileal mucosa, and unremarkable colonic mucosa with preserved crypt architecture and marked acute and chronic inflammation with cryptitis. The patient tolerated the procedures well. The patient admits to a family history of GI problems. The patient’s maternal aunt had ?liver vs. gallbladder issue that required surgery. The patient’s sister had a history of colonic polyps age 45.

## 2021-05-10 NOTE — ASSESSMENT
[FreeTextEntry1] : Dyspepsia: The patient complains of dyspeptic symptoms.  The patient was advised to abide by an anti-gas diet.  The patient was given a pamphlet for anti-gas.  The patient and I reviewed the anti-gas diet at length. The patient is to start on a trial of Phazyme one tablet 3 times a day p.r.n. abdominal pain and gas.\par GERD: The patient was advised to avoid late-night meals and dietary indiscretions.  The patient was advised to avoid fried and fatty foods.  The patient was advised to abide by an anti-GERD diet. The patient was given a pamphlet for anti-GERD.  The patient and I reviewed the anti-GERD diet at length. I recommend a trial of Pantoprazole 40 mg once a day x 3 months for the symptoms.\par Eosinophilic Esophagitis: The patient had a history of findings suggestive of eosinophilic esophagitis on recent upper endoscopy. I recommend a trial of pantoprazole 40 mg once of the possible eosinophilic esophagitis. If the symptoms persist, the patient may require a trial of steroids (Fluticasone proprionate 220 mcg/puff twice a day (swallowed without a spacer and the mouth is rinsed with water after use) for 6 weeks) for the eosinophilic esophagitis. The patient may also require evaluation for possible food allergies for a cause of the eosinophilic esophagitis. The patient and I had a long discussion regarding the risks of eosinophilic esophagitis. The patient was told of the possibility of dysphagia, chest pain, gastroesophageal reflux disease and abdominal pain. I recommend avoid late night meals and dietary indiscretions. The patient was advised to avoid NSAIDs and Aspirin. \par Reflux Esophagitis: The patient had reflux esophagitis noted on prior upper endoscopy. The patient was advised to avoid late-night meals and dietary indiscretions. The patient was advised to avoid fried and fatty foods. The patient was advised to abide by an anti-GERD diet. The patient was given a pamphlet for anti-GERD. The patient and I reviewed the anti-GERD diet at length\par Hiatal Hernia: The patient was advised to avoid late-night meals and dietary indiscretions. The patient was advised to avoid fried and fatty foods. The patient was advised to abide by an anti-GERD diet. The patient was given a pamphlet for anti-GERD. The patient and I reviewed the anti-GERD diet at length. I recommend a trial of Pantoprazole 40 mg once a day for 3 months for the symptoms.\par Gastritis: The patient has a history of gastritis. The patient is to avoid nonsteroidal anti-inflammatory drugs and aspirin. I recommend a trial of pantoprazole 40 mg once a day for 3 months for the symptoms. \par H. pylori Gastritis: The patient was found to have H. pylori gastritis on recent upper endoscopy. The patient completed a trial of Clarithromycin 500 mg 2 times a day, Amoxicilin 500mg twice a day and Omeprazole 40 mg twice a day for 14 days for H. pylori eradication. The patient had an H. pylori breath test performed to assess for the eradication of the bacteria. The H. pylori breath test performed on November 9, 2020 was not detected.\par Intestinal Metaplasia of the Stomach: The patient was found to have intestinal metaplasia of the stomach on recent upper endoscopy. The findings of intestinal metaplasia of the stomach have an increased risk of gastric cancer. Recent biopsies did not reveal dysplasia. I recommend a repeat upper endoscopy with mapping in 3 years to reassess for intestinal metaplasia and dysplasia unless symptomatic. The patient is aware of the increased risk of intestinal metaplasia and progression to stomach cancer. The patient agrees to follow-up.\par Internal Hemorrhoids: The patient is to be started on a trial of Anusol H. C. suppositories one per rectum nightly and Anusol HC2.5% cream apply to affected area twice a day p.r.n. hemorrhoidal bleeding or pain. \par Family History of Colonic Polyps: The patient has a family history of colonic polyps. I recommend a repeat colonoscopy in 5 years to reassess for colonic polyps unless symptomatic. The patient agreed and will follow up for the procedure. \par Imaging Study: I recommend an imaging study to assess the symptoms. I recommend an abdominal ultrasound to assess the liver, gallbladder and pancreas.\par Blood Work: I recommend obtaining the recent blood work performed by the patient's PMD.\par Follow-up: The patient is to follow-up in the office in 6 months to reassess the symptoms. The patient was told to call the office if any further problems.\par \par

## 2021-11-08 ENCOUNTER — APPOINTMENT (OUTPATIENT)
Dept: GASTROENTEROLOGY | Facility: CLINIC | Age: 46
End: 2021-11-08

## 2021-11-08 VITALS
OXYGEN SATURATION: 97 % | TEMPERATURE: 97.3 F | WEIGHT: 179 LBS | BODY MASS INDEX: 28.09 KG/M2 | HEART RATE: 83 BPM | HEIGHT: 67 IN | SYSTOLIC BLOOD PRESSURE: 132 MMHG | DIASTOLIC BLOOD PRESSURE: 92 MMHG

## 2022-01-03 ENCOUNTER — APPOINTMENT (OUTPATIENT)
Dept: GASTROENTEROLOGY | Facility: CLINIC | Age: 47
End: 2022-01-03
Payer: COMMERCIAL

## 2022-01-03 VITALS
TEMPERATURE: 98.2 F | OXYGEN SATURATION: 100 % | HEIGHT: 66 IN | WEIGHT: 181 LBS | HEART RATE: 83 BPM | DIASTOLIC BLOOD PRESSURE: 92 MMHG | SYSTOLIC BLOOD PRESSURE: 132 MMHG | BODY MASS INDEX: 29.09 KG/M2

## 2022-01-03 PROCEDURE — 99214 OFFICE O/P EST MOD 30 MIN: CPT

## 2022-01-03 NOTE — HISTORY OF PRESENT ILLNESS
[None] : had no significant interval events [Nausea] : denies nausea [Vomiting] : denies vomiting [Diarrhea] : resolved diarrhea [Constipation] : denies constipation [Yellow Skin Or Eyes (Jaundice)] : denies jaundice [Rectal Pain] : denies rectal pain [Wt Gain ___ Lbs] : recent [unfilled] ~Upound(s) weight gain [Heartburn] : heartburn [Abdominal Pain] : abdominal pain [Abdominal Swelling] : abdominal swelling [GERD] : gastroesophageal reflux disease [Wt Loss ___ Lbs] : no recent weight loss [Hiatus Hernia] : no hiatus hernia [Peptic Ulcer Disease] : no peptic ulcer disease [Pancreatitis] : no pancreatitis [Cholelithiasis] : no cholelithiasis [Kidney Stone] : no kidney stone [Inflammatory Bowel Disease] : no inflammatory bowel disease [Irritable Bowel Syndrome] : no irritable bowel syndrome [Diverticulitis] : no diverticulitis [Alcohol Abuse] : no alcohol abuse [Malignancy] : no malignancy [Abdominal Surgery] : no abdominal surgery [Appendectomy] : no appendectomy [Cholecystectomy] : no cholecystectomy [de-identified] : The patient states that he is feeling better.   The patient recently recovered from COVID 19 infection on December 23, 2021. The patient denies any need for hospitalization.  The patient was under self-quarantine.  The patient had symptoms of shortness of breath, nasal congestion, chest discomfort, malaise, conjunctivitis, fever, chill, loss of taste, loss of smell.  The diagnosis was confirmed with SARS Related Coronavirus PCR RNA nasopharyngeal swab.  The patient had positive COVID 19 IgG antibodies. The patient has recovered.  The patient denies any jaundice or pruritus.  The patient denies any chronic lower back pain. The patient complains of abdominal pain.  The patient describes the abdominal pain as a sharp, intermittent left upper quadrant discomfort that is nonradiating in nature.  The abdominal pain is worse with meals and improves with passing gas or having a bowel movement.  The abdominal pain is described as mild in nature.  The abdominal pain occurs at night and in the morning.  The abdominal pain can occur at any time.   The abdominal pain has never awakened the patient from sleep.  The abdominal pain is not relieved with medication.  The abdominal pain is associated with abdominal gas and bloating.  The patient denies any nausea or vomiting.  The patient complains of occasional gastroesophageal reflux disease but denies any dysphagia.  The patient denies taking medications for the gastroesophageal reflux disease.  The gastroesophageal reflux disease is worse after meals and late at night and in the early morning. The gastroesophageal reflux disease previously improved with proton pump inhibitors, H2 blockers and antacids.  The patient previously complained of palpitations during the COVID 19 infection that resolved spontaneously.   The patient currently denies any atypical chest pain, shortness of breath or palpitations.  The patient denies any diaphoresis. The patient denies any constipation or diarrhea.  The patient has 1 bowel movement a day.  The patient denies a change in bowel habits.  The patient denies a change in caliber of stool.  The patient denies having mucus discharge with the bowel movements.  The patient denies any bright red blood per rectum, melena or hematemesis.  The patient denies any rectal pain or rectal pruritus.  The patient denies any weight loss or anorexia.  The patient admits to gaining weight recently.  He denies any fevers or chills. The patient had an upper endoscopy and colonoscopy to the cecum and terminal ileum performed at the [facility] endoscopy suite on September 1, 2020. The upper endoscopy was performed up to the level of the second portion of the duodenum. The upper endoscopy revealed mild mid to distal esophagitis with linear erosions, a small hiatal hernia and mild diffuse gastritis. Biopsies were taken of the distal esophagus, antrum, body of stomach and duodenum to assess for esophagitis, gastritis and duodenitis. The pathology revealed distal esophagus with fragments of squamous esophageal epithelium carpeted with eosinophils too numerous to count suggestive of eosinophilic esophagitis with no evidence of fungal microorganisms, gastric antral mucosa with chronic moderate active gastritis with focal incomplete intestinal metaplasia that was positive for Helicobacter pylori, gastric antral and body mucosa with chronic moderate active gastritis that was positive for Helicobacter pylori and unremarkable duodenal mucosa with preserved villous architecture with no evidence of parasites or intraepithelial lymphocytes. The patient completed a trial of Clarithromycin 500 mg 2 times a day, Amoxicillin 500mg (2 tablets) twice a day and Omeprazole 40 mg twice a day for 14 days for H. pylori eradication. The H. pylori breath test performed on November 9, 2020 was not detected. The colonoscopy to the terminal ileum revealed a normal but long and tortuous colon and small internal hemorrhoids. Random biopsies were taken of the terminal ileum and cecum to assess for ileitis and microscopic colitis. There were no other polyps, masses, diverticulosis, AVMs or colitis noted. The pathology revealed unremarkable ileal mucosa, and unremarkable colonic mucosa with preserved crypt architecture and marked acute and chronic inflammation with cryptitis. The patient tolerated the procedures well. The patient admits to a family history of GI problems. The patient’s maternal aunt had ?liver vs. gallbladder issue that required surgery. The patient’s sister had a history of colonic polyps age 45.  [de-identified] : (-) smoking, (-) ETOH, (-) IVDA\par

## 2022-01-03 NOTE — ASSESSMENT
[FreeTextEntry1] : Abdominal Pain: The patient complains of abdominal pain. The patient is to avoid nonsteroidal anti-inflammatory drugs and aspirin. I recommend a trial of Pantoprazole 40 mg once a day for 3 months for the symptoms.  \par Dyspepsia: The patient complains of dyspeptic symptoms.  The patient was advised to continue to abide by an anti-gas (low FOD-MAP) diet.  The patient was previously given a pamphlet for anti-gas (low FOD-MAP).  The patient and I reviewed the anti-gas (low FOD-MAP)diet at length again. The patient is to continue on a trial of Simethicone one tablet 4 times a day p.r.n. abdominal pain and gas.\par GERD: The patient was advised to avoid late-night meals and dietary indiscretions.  The patient was advised to avoid fried and fatty foods.  The patient was advised to abide by an anti-GERD diet. The patient was given a pamphlet for anti-GERD.  The patient and I reviewed the anti-GERD diet at length. I recommend a trial of Pantoprazole 40 mg once a day x 3 months for the symptoms.\par If the symptoms persist, the patient may require an upper endoscopy to assess for peptic ulcer disease versus esophagitis.  The patient was told of the risks and benefits of the procedure.  The patient was told of the risks of perforation, emergency surgery, bleeding, infections and missed lesions.  The patient agreed and will follow-up to reassess the symptoms.\par Eosinophilic Esophagitis: The patient had a history of findings suggestive of eosinophilic esophagitis on recent upper endoscopy. I recommend a trial of pantoprazole 40 mg once of the possible eosinophilic esophagitis. If the symptoms persist, the patient may require a trial of steroids (Fluticasone proprionate 220 mcg/puff twice a day (swallowed without a spacer and the mouth is rinsed with water after use) for 6 weeks) for the eosinophilic esophagitis. The patient may also require evaluation for possible food allergies for a cause of the eosinophilic esophagitis. The patient and I had a long discussion regarding the risks of eosinophilic esophagitis. The patient was told of the possibility of dysphagia, chest pain, gastroesophageal reflux disease and abdominal pain. I recommend avoid late night meals and dietary indiscretions. The patient was advised to avoid NSAIDs and Aspirin. \par Reflux Esophagitis: The patient had reflux esophagitis noted on prior upper endoscopy. The patient was advised to avoid late-night meals and dietary indiscretions. The patient was advised to avoid fried and fatty foods. The patient was advised to abide by an anti-GERD diet. The patient was given a pamphlet for anti-GERD. The patient and I reviewed the anti-GERD diet at length\par Hiatal Hernia: The patient was advised to avoid late-night meals and dietary indiscretions. The patient was advised to avoid fried and fatty foods. The patient was advised to abide by an anti-GERD diet. The patient was given a pamphlet for anti-GERD. The patient and I reviewed the anti-GERD diet at length. I recommend a trial of Pantoprazole 40 mg once a day for 3 months for the symptoms.\par Gastritis: The patient has a history of gastritis. The patient is to avoid nonsteroidal anti-inflammatory drugs and aspirin. I recommend a trial of pantoprazole 40 mg once a day for 3 months for the symptoms. \par H. pylori Gastritis: The patient was found to have H. pylori gastritis on recent upper endoscopy. The patient completed a trial of Clarithromycin 500 mg 2 times a day, Amoxicilin 500mg (2 tablets)twice a day and Omeprazole 40 mg twice a day for 14 days for H. pylori eradication. The patient had an H. pylori breath test performed to assess for the eradication of the bacteria. The H. pylori breath test performed on November 9, 2020 was not detected.\par Intestinal Metaplasia of the Stomach: The patient was found to have intestinal metaplasia of the stomach on recent upper endoscopy. The findings of intestinal metaplasia of the stomach have an increased risk of gastric cancer. Recent biopsies did not reveal dysplasia. I recommend a repeat upper endoscopy with mapping in 2 years to reassess for intestinal metaplasia and dysplasia unless symptomatic. The patient is aware of the increased risk of intestinal metaplasia and progression to stomach cancer. The patient agrees to follow-up.\par Internal Hemorrhoids: The patient is to be started on a trial of Anusol H. C. suppositories one per rectum nightly and Anusol HC2.5% cream apply to affected area twice a day p.r.n. hemorrhoidal bleeding or pain. \par Family History of Colonic Polyps: The patient has a family history of colonic polyps. I recommend a repeat colonoscopy in 4 years to reassess for colonic polyps unless symptomatic. The patient agreed and will follow up for the procedure. \par Imaging Study: I recommend an imaging study to assess the symptoms. I recommend an abdominal ultrasound to assess the liver, gallbladder and pancreas.\par Blood Work: I recommend obtaining the recent blood work performed by the patient's PMD.\par Follow-up: The patient is to follow-up in the office in 3 months to reassess the symptoms. The patient was told to call the office if any further problems.\par

## 2022-01-28 ENCOUNTER — APPOINTMENT (OUTPATIENT)
Dept: ULTRASOUND IMAGING | Facility: HOSPITAL | Age: 47
End: 2022-01-28
Payer: COMMERCIAL

## 2022-01-28 ENCOUNTER — OUTPATIENT (OUTPATIENT)
Dept: OUTPATIENT SERVICES | Facility: HOSPITAL | Age: 47
LOS: 1 days | End: 2022-01-28
Payer: COMMERCIAL

## 2022-01-28 DIAGNOSIS — R10.13 EPIGASTRIC PAIN: ICD-10-CM

## 2022-01-28 DIAGNOSIS — R10.11 RIGHT UPPER QUADRANT PAIN: ICD-10-CM

## 2022-01-28 PROCEDURE — 76700 US EXAM ABDOM COMPLETE: CPT | Mod: 26

## 2022-01-28 PROCEDURE — 76700 US EXAM ABDOM COMPLETE: CPT

## 2022-02-01 ENCOUNTER — NON-APPOINTMENT (OUTPATIENT)
Age: 47
End: 2022-02-01

## 2022-02-25 DIAGNOSIS — R73.09 OTHER ABNORMAL GLUCOSE: ICD-10-CM

## 2022-02-25 DIAGNOSIS — Z80.0 FAMILY HISTORY OF MALIGNANT NEOPLASM OF DIGESTIVE ORGANS: ICD-10-CM

## 2022-02-25 DIAGNOSIS — Z82.3 FAMILY HISTORY OF STROKE: ICD-10-CM

## 2022-03-04 PROBLEM — Z86.19 HISTORY OF HELICOBACTER PYLORI INFECTION: Status: RESOLVED | Noted: 2020-09-30 | Resolved: 2022-03-04

## 2022-03-04 PROBLEM — R10.11 ABDOMINAL PAIN, BILATERAL UPPER QUADRANT: Status: RESOLVED | Noted: 2020-11-09 | Resolved: 2022-03-04

## 2022-03-04 PROBLEM — R10.13 DYSPEPSIA: Status: RESOLVED | Noted: 2020-08-14 | Resolved: 2022-03-04

## 2022-03-04 PROBLEM — K21.00 CHRONIC REFLUX ESOPHAGITIS: Status: RESOLVED | Noted: 2020-09-30 | Resolved: 2022-03-04

## 2022-03-04 PROBLEM — Z01.818 PRE-OP TESTING: Status: RESOLVED | Noted: 2020-08-29 | Resolved: 2022-03-04

## 2022-03-04 PROBLEM — Z86.19 HISTORY OF HELICOBACTER PYLORI INFECTION: Status: RESOLVED | Noted: 2022-01-03 | Resolved: 2022-03-04

## 2022-03-04 PROBLEM — Z87.19 HISTORY OF RECTAL BLEEDING: Status: RESOLVED | Noted: 2020-08-14 | Resolved: 2022-03-04

## 2022-03-04 PROBLEM — K20.0 EOSINOPHILIC ESOPHAGITIS: Status: RESOLVED | Noted: 2020-09-30 | Resolved: 2022-03-04

## 2022-03-04 PROBLEM — Z01.818 PREOPERATIVE EXAMINATION: Status: RESOLVED | Noted: 2020-08-14 | Resolved: 2022-03-04

## 2022-03-04 PROBLEM — Z87.898 HISTORY OF DIARRHEA: Status: RESOLVED | Noted: 2020-11-09 | Resolved: 2022-03-04

## 2022-03-04 PROBLEM — Z87.19 HISTORY OF EPIGASTRIC PAIN: Status: RESOLVED | Noted: 2020-08-14 | Resolved: 2022-03-04

## 2022-03-04 PROBLEM — K29.30 CHRONIC SUPERFICIAL GASTRITIS WITHOUT BLEEDING: Status: RESOLVED | Noted: 2020-09-30 | Resolved: 2022-03-04

## 2022-03-05 ENCOUNTER — APPOINTMENT (OUTPATIENT)
Dept: FAMILY MEDICINE | Facility: CLINIC | Age: 47
End: 2022-03-05
Payer: COMMERCIAL

## 2022-03-05 VITALS
SYSTOLIC BLOOD PRESSURE: 112 MMHG | BODY MASS INDEX: 29.41 KG/M2 | HEART RATE: 86 BPM | RESPIRATION RATE: 16 BRPM | DIASTOLIC BLOOD PRESSURE: 78 MMHG | HEIGHT: 66 IN | WEIGHT: 183 LBS | TEMPERATURE: 98 F | OXYGEN SATURATION: 96 %

## 2022-03-05 DIAGNOSIS — Z87.19 PERSONAL HISTORY OF OTHER DISEASES OF THE DIGESTIVE SYSTEM: ICD-10-CM

## 2022-03-05 DIAGNOSIS — R10.13 EPIGASTRIC PAIN: ICD-10-CM

## 2022-03-05 DIAGNOSIS — R10.12 RIGHT UPPER QUADRANT PAIN: ICD-10-CM

## 2022-03-05 DIAGNOSIS — Z86.19 PERSONAL HISTORY OF OTHER INFECTIOUS AND PARASITIC DISEASES: ICD-10-CM

## 2022-03-05 DIAGNOSIS — K29.30 CHRONIC SUPERFICIAL GASTRITIS W/OUT BLEEDING: ICD-10-CM

## 2022-03-05 DIAGNOSIS — Z01.818 ENCOUNTER FOR OTHER PREPROCEDURAL EXAMINATION: ICD-10-CM

## 2022-03-05 DIAGNOSIS — K20.0 EOSINOPHILIC ESOPHAGITIS: ICD-10-CM

## 2022-03-05 DIAGNOSIS — Z87.898 PERSONAL HISTORY OF OTHER SPECIFIED CONDITIONS: ICD-10-CM

## 2022-03-05 DIAGNOSIS — K21.00 GASTRO-ESOPHAGEAL REFLUX DISEASE WITH ESOPHAGITIS, WITHOUT BLEEDING: ICD-10-CM

## 2022-03-05 DIAGNOSIS — R10.11 RIGHT UPPER QUADRANT PAIN: ICD-10-CM

## 2022-03-05 PROCEDURE — 99213 OFFICE O/P EST LOW 20 MIN: CPT

## 2022-03-05 RX ORDER — HYDROCORTISONE 25 MG/G
2.5 CREAM TOPICAL
Qty: 2 | Refills: 3 | Status: DISCONTINUED | COMMUNITY
Start: 2020-09-30 | End: 2022-03-05

## 2022-03-05 RX ORDER — CLARITHROMYCIN 500 MG/1
500 TABLET, FILM COATED ORAL TWICE DAILY
Qty: 28 | Refills: 0 | Status: DISCONTINUED | COMMUNITY
Start: 2020-09-30 | End: 2022-03-05

## 2022-03-05 RX ORDER — POLYETHYLENE GLYCOL 3350, SODIUM CHLORIDE, SODIUM BICARBONATE AND POTASSIUM CHLORIDE WITH LEMON FLAVOR 420; 11.2; 5.72; 1.48 G/4L; G/4L; G/4L; G/4L
420 POWDER, FOR SOLUTION ORAL
Qty: 1 | Refills: 0 | Status: DISCONTINUED | COMMUNITY
Start: 2020-08-14 | End: 2022-03-05

## 2022-03-05 RX ORDER — PANTOPRAZOLE 40 MG/1
40 TABLET, DELAYED RELEASE ORAL
Qty: 30 | Refills: 3 | Status: DISCONTINUED | COMMUNITY
Start: 2020-08-14 | End: 2022-03-05

## 2022-03-05 RX ORDER — OMEPRAZOLE 40 MG/1
40 CAPSULE, DELAYED RELEASE ORAL TWICE DAILY
Qty: 28 | Refills: 0 | Status: DISCONTINUED | COMMUNITY
Start: 2020-09-30 | End: 2022-03-05

## 2022-03-05 RX ORDER — PANTOPRAZOLE 40 MG/1
40 TABLET, DELAYED RELEASE ORAL DAILY
Qty: 30 | Refills: 3 | Status: DISCONTINUED | COMMUNITY
Start: 2021-01-06 | End: 2022-03-05

## 2022-03-05 RX ORDER — AMOXICILLIN 500 MG/1
500 CAPSULE ORAL TWICE DAILY
Qty: 56 | Refills: 0 | Status: DISCONTINUED | COMMUNITY
Start: 2020-09-30 | End: 2022-03-05

## 2022-03-05 RX ORDER — HYDROCORTISONE ACETATE 25 MG/1
25 SUPPOSITORY RECTAL
Qty: 30 | Refills: 3 | Status: DISCONTINUED | COMMUNITY
Start: 2020-08-14 | End: 2022-03-05

## 2022-03-05 RX ORDER — PANTOPRAZOLE 40 MG/1
40 TABLET, DELAYED RELEASE ORAL DAILY
Qty: 30 | Refills: 3 | Status: DISCONTINUED | COMMUNITY
Start: 2020-11-09 | End: 2022-03-05

## 2022-03-05 RX ORDER — HYDROCORTISONE 25 MG/G
2.5 CREAM TOPICAL
Qty: 2 | Refills: 3 | Status: DISCONTINUED | COMMUNITY
Start: 2020-08-14 | End: 2022-03-05

## 2022-03-05 RX ORDER — PANTOPRAZOLE 40 MG/1
40 TABLET, DELAYED RELEASE ORAL DAILY
Qty: 30 | Refills: 3 | Status: DISCONTINUED | COMMUNITY
Start: 2021-05-10 | End: 2022-03-05

## 2022-03-05 RX ORDER — HYDROCORTISONE ACETATE 25 MG/1
25 SUPPOSITORY RECTAL
Qty: 30 | Refills: 3 | Status: DISCONTINUED | COMMUNITY
Start: 2020-09-30 | End: 2022-03-05

## 2022-03-05 RX ORDER — SIMVASTATIN 5 MG/1
5 TABLET, FILM COATED ORAL
Qty: 30 | Refills: 0 | Status: DISCONTINUED | COMMUNITY
Start: 2021-02-27 | End: 2022-03-05

## 2022-03-05 NOTE — PHYSICAL EXAM
[Soft] : abdomen soft [Non Tender] : non-tender [Non-distended] : non-distended [No Masses] : no abdominal mass palpated [No HSM] : no HSM [de-identified] : Left middle toe: mild swollen with erythematous and tenderness at middle metatarsophalangeal joint

## 2022-03-05 NOTE — HISTORY OF PRESENT ILLNESS
[de-identified] : Fasting glucose was 119, HBA1c 6.0, total cholesterol 225, , and uric acid 8.3. Pt was recalled back for abnormal test reports. Reports were reviewed with pt in details. Other blood tests came back wnl. Pt has discontinued all his medications for 1 month.\par Pt woke up this morning feeling left middle toe pain with mild swollen. \par Pt also complained 1-2 months h/o left upper abdominal pain. Pt felt pain when he took deep breath. Pt saw GI and had EGD and colonoscopy that all came back unremarkable. Colonoscopy showed polyp, s/p polypectomy, benign. Pt will see GI in 2 weeks for follow up. \par \par \par

## 2022-04-04 ENCOUNTER — APPOINTMENT (OUTPATIENT)
Dept: GASTROENTEROLOGY | Facility: CLINIC | Age: 47
End: 2022-04-04
Payer: COMMERCIAL

## 2022-04-04 VITALS
BODY MASS INDEX: 29.25 KG/M2 | HEIGHT: 66 IN | TEMPERATURE: 97 F | HEART RATE: 82 BPM | DIASTOLIC BLOOD PRESSURE: 78 MMHG | OXYGEN SATURATION: 98 % | WEIGHT: 182 LBS | SYSTOLIC BLOOD PRESSURE: 126 MMHG

## 2022-04-04 DIAGNOSIS — K64.8 OTHER HEMORRHOIDS: ICD-10-CM

## 2022-04-04 DIAGNOSIS — K31.A0 GASTRIC INTESTINAL METAPLASIA, UNSPECIFIED: ICD-10-CM

## 2022-04-04 DIAGNOSIS — R07.89 OTHER CHEST PAIN: ICD-10-CM

## 2022-04-04 PROCEDURE — 99214 OFFICE O/P EST MOD 30 MIN: CPT

## 2022-04-04 NOTE — HISTORY OF PRESENT ILLNESS
[None] : had no significant interval events [Nausea] : denies nausea [Vomiting] : denies vomiting [Diarrhea] : denies diarrhea [Constipation] : denies constipation [Yellow Skin Or Eyes (Jaundice)] : denies jaundice [Rectal Pain] : denies rectal pain [Heartburn] : heartburn [Abdominal Pain] : abdominal pain [Abdominal Swelling] : abdominal swelling [GERD] : gastroesophageal reflux disease [Wt Gain ___ Lbs] : no recent weight gain [Wt Loss ___ Lbs] : no recent weight loss [Hiatus Hernia] : no hiatus hernia [Peptic Ulcer Disease] : no peptic ulcer disease [Pancreatitis] : no pancreatitis [Cholelithiasis] : no cholelithiasis [Kidney Stone] : no kidney stone [Inflammatory Bowel Disease] : no inflammatory bowel disease [Irritable Bowel Syndrome] : no irritable bowel syndrome [Diverticulitis] : no diverticulitis [Alcohol Abuse] : no alcohol abuse [Malignancy] : no malignancy [Abdominal Surgery] : no abdominal surgery [Appendectomy] : no appendectomy [Cholecystectomy] : no cholecystectomy [de-identified] : The patient recently recovered from COVID 19 infection on December 23, 2021. The patient denies any need for hospitalization. The patient was under self-quarantine. The patient had symptoms of shortness of breath, nasal congestion, chest discomfort, malaise, conjunctivitis, fever, chill, loss of taste, loss of smell. The diagnosis was confirmed with SARS Related Coronavirus PCR RNA nasopharyngeal swab. The patient had positive COVID 19 IgG antibodies. The patient has recovered.  The patient states that he is feeling better. The patient denies any jaundice or pruritus.  The patient complains of occasional lower back pain. The patient complains of occasional abdominal pain.  The patient describes the abdominal pain as a crampy, intermittent left upper quadrant discomfort that occasionally radiates to the back.  The abdominal pain is worse with meals and improves with passing gas or having a bowel movement.  The abdominal pain is described as mild in nature.  The abdominal pain occurs at night and in the morning.  The abdominal pain can occur at any time.   The abdominal pain has never awakened the patient from sleep.  The abdominal pain is relieved with certain medication such as proton pump inhibitors, H2 blockers and antacids.  The abdominal pain is relieved with mary tea.  The abdominal pain is associated with abdominal gas and bloating.  The patient denies any nausea or vomiting.  The patient complains of occasional gastroesophageal reflux disease but denies any dysphagia.    The patient denies taking medications for the gastroesophageal reflux disease.  The gastroesophageal reflux disease is worse after meals and late at night and in the early morning. The patient complains of 2 episodes of atypical chest pain but denies any shortness of breath or palpitations.  The chest pain is described as a pressure, intermittent substernal discomfort that is nonradiating in nature.  The patient denies any diaphoresis. The chest pain is described as 6 to 7 out of 10 in intensity.  The chest pain can occur at any time.  The chest pain is worse after lunch.  The chest pain is worse after meals and improves with passing gas.  The chest pain has never awakened the patient from sleep.  The patient denies any constipation or diarrhea.  The patient has 1 bowel movement a day. The patient denies a change in bowel habits.  The patient denies a change in caliber of stool.  The patient denies having mucus discharge with the bowel movements.  The patient denies any bright red blood per rectum, melena or hematemesis.  The patient complains of occasional rectal pain but denies any rectal pruritus.  The patient denies any weight loss or anorexia.   He denies any fevers or chills.  The patient had an upper endoscopy and colonoscopy to the cecum and terminal ileum performed at the [facility] endoscopy suite on September 1, 2020. The upper endoscopy was performed up to the level of the second portion of the duodenum. The upper endoscopy revealed mild mid to distal esophagitis with linear erosions, a small hiatal hernia and mild diffuse gastritis. Biopsies were taken of the distal esophagus, antrum, body of stomach and duodenum to assess for esophagitis, gastritis and duodenitis. The pathology revealed distal esophagus with fragments of squamous esophageal epithelium carpeted with eosinophils too numerous to count suggestive of eosinophilic esophagitis with no evidence of fungal microorganisms, gastric antral mucosa with chronic moderate active gastritis with focal incomplete intestinal metaplasia that was positive for Helicobacter pylori, gastric antral and body mucosa with chronic moderate active gastritis that was positive for Helicobacter pylori and unremarkable duodenal mucosa with preserved villous architecture with no evidence of parasites or intraepithelial lymphocytes. The patient completed a trial of Clarithromycin 500 mg 2 times a day, Amoxicillin 500mg (2 tablets) twice a day and Omeprazole 40 mg twice a day for 14 days for H. pylori eradication. The H. pylori breath test performed on November 9, 2020 was not detected. The colonoscopy to the terminal ileum revealed a normal but long and tortuous colon and small internal hemorrhoids. Random biopsies were taken of the terminal ileum and cecum to assess for ileitis and microscopic colitis. There were no other polyps, masses, diverticulosis, AVMs or colitis noted. The pathology revealed unremarkable ileal mucosa, and unremarkable colonic mucosa with preserved crypt architecture and marked acute and chronic inflammation with cryptitis. The patient tolerated the procedures well. The abdominal ultrasound performed on January 28, 2022 revealed subcentimeter gallbladder polyps.  The patient admits to a family history of GI problems. The patient’s maternal aunt had ?liver vs. gallbladder issue that required surgery. The patient’s sister had a history of colonic polyps age 45.  [de-identified] : (-) smoking, (-) ETOH, (-) IVDA\par

## 2022-04-04 NOTE — ASSESSMENT
[FreeTextEntry1] : Abdominal Pain: The patient complains of abdominal pain. The patient is to avoid nonsteroidal anti-inflammatory drugs and aspirin.  I recommend a trial of Pepcid 40 mg twice a day for 3 months for the symptoms.\par Dyspepsia: The patient complains of dyspeptic symptoms.  The patient was advised to continue to abide by an anti-gas (low FOD-MAP) diet.  The patient was previously given a pamphlet for anti-gas (low FOD-MAP).  The patient and I reviewed the anti-gas (low FOD-MAP)diet at length again. The patient is to continue on a trial of Simethicone one tablet 4 times a day p.r.n. abdominal pain and gas.\par GERD: The patient was advised to avoid late-night meals and dietary indiscretions.  The patient was advised to avoid fried and fatty foods.  The patient was advised to abide by an anti-GERD diet. The patient was given a pamphlet for anti-GERD.  The patient and I reviewed the anti-GERD diet at length. I recommend a trial of famotidine 40 mg twice a day x 3 months for the symptoms.\par Atypical Chest Pain: The patient complains of atypical chest pain of unclear etiology.  The patient was advised to follow up with the PMD and cardiologist regarding evaluation for the atypical chest pain. The patient was told of possible etiologies such as cardiac, pulmonary, GI, musculoskeletal, stress and other causes for the atypical chest pain.  The patient agrees and will follow-up with the PMD and cardiologist. \par If the symptoms persist, the patient may require an upper endoscopy to assess for peptic ulcer disease versus esophagitis. The patient was told of the risks and benefits of the procedure. The patient was told of the risks of perforation, emergency surgery, bleeding, infections and missed lesions. The patient agreed and will follow-up to reassess the symptoms.\par Eosinophilic Esophagitis: The patient had a history of findings suggestive of eosinophilic esophagitis on recent upper endoscopy. I recommend a trial of pantoprazole 40 mg once of the possible eosinophilic esophagitis if symptoms recur. If the symptoms persist, the patient may require a trial of steroids (Fluticasone proprionate 220 mcg/puff twice a day (swallowed without a spacer and the mouth is rinsed with water after use) for 6 weeks) for the eosinophilic esophagitis. The patient may also require evaluation for possible food allergies for a cause of the eosinophilic esophagitis. The patient and I had a long discussion regarding the risks of eosinophilic esophagitis. The patient was told of the possibility of dysphagia, chest pain, gastroesophageal reflux disease and abdominal pain. I recommend avoid late night meals and dietary indiscretions. The patient was advised to avoid NSAIDs and Aspirin. \par Reflux Esophagitis: The patient had reflux esophagitis noted on prior upper endoscopy. The patient was advised to avoid late-night meals and dietary indiscretions. The patient was advised to avoid fried and fatty foods. The patient was advised to abide by an anti-GERD diet. The patient was given a pamphlet for anti-GERD. The patient and I reviewed the anti-GERD diet at length\par Hiatal Hernia: The patient was advised to avoid late-night meals and dietary indiscretions. The patient was advised to avoid fried and fatty foods. The patient was advised to abide by an anti-GERD diet. The patient was given a pamphlet for anti-GERD. The patient and I reviewed the anti-GERD diet at length. I recommend a trial of Pantoprazole 40 mg once a day for 3 months for the symptoms.\par Gastritis: The patient has a history of gastritis. The patient is to avoid nonsteroidal anti-inflammatory drugs and aspirin. I recommend a trial of Famotidine 40 mg twice a day for 3 months for the symptoms. \par H. pylori Gastritis: The patient was found to have H. pylori gastritis on recent upper endoscopy. The patient completed a trial of Clarithromycin 500 mg 2 times a day, Amoxicillin 500mg (2 tablets) twice a day and Omeprazole 40 mg twice a day for 14 days for H. pylori eradication. The patient had an H. pylori breath test performed to assess for the eradication of the bacteria. The H. pylori breath test performed on November 9, 2020 was not detected.\par Intestinal Metaplasia of the Stomach: The patient was found to have intestinal metaplasia of the stomach on recent upper endoscopy. The findings of intestinal metaplasia of the stomach have an increased risk of gastric cancer. Recent biopsies did not reveal dysplasia. I recommend a repeat upper endoscopy with mapping in 2 years to reassess for intestinal metaplasia and dysplasia unless symptomatic. The patient is aware of the increased risk of intestinal metaplasia and progression to stomach cancer. The patient agrees to follow-up.\par Gallbladder Polyps: The patient has gallbladder polyps on prior  abdominal ultrasound.  The patient and I had a long discussion regarding the risks of gallbladder polyps progressing to gallbladder cancer if the size is greater than 1cm.  The patient was told of the importance for follow-up for serial abdominal ultrasounds.  The patient was told of the possible need for a cholecystectomy if the gallbladder polyps are greater than 1cm.  The patient agreed and will follow-up.   \par Internal Hemorrhoids: The patient is to be started on a trial of Anusol H. C. suppositories one per rectum nightly and Anusol HC2.5% cream apply to affected area twice a day p.r.n. hemorrhoidal bleeding or pain. \par Family History of Colonic Polyps: The patient has a family history of colonic polyps. I recommend a repeat colonoscopy in 4 years to reassess for colonic polyps unless symptomatic. The patient agreed and will follow up for the procedure. \par Blood Work: I recommend obtaining the recent blood work performed by the patient's PMD.\par Follow-up: The patient is to follow-up in the office in 6 months to reassess the symptoms. The patient was told to call the office if any further problems.\par

## 2022-04-29 ENCOUNTER — RX CHANGE (OUTPATIENT)
Age: 47
End: 2022-04-29

## 2022-04-29 RX ORDER — FAMOTIDINE 40 MG/1
40 TABLET, FILM COATED ORAL
Qty: 60 | Refills: 3 | Status: DISCONTINUED | COMMUNITY
Start: 2022-04-04 | End: 2022-04-29

## 2022-06-04 ENCOUNTER — APPOINTMENT (OUTPATIENT)
Dept: FAMILY MEDICINE | Facility: CLINIC | Age: 47
End: 2022-06-04

## 2022-06-11 ENCOUNTER — APPOINTMENT (OUTPATIENT)
Dept: FAMILY MEDICINE | Facility: CLINIC | Age: 47
End: 2022-06-11

## 2022-07-01 NOTE — HISTORY OF PRESENT ILLNESS
[de-identified] : Pt came back for q 3 months follow up. Pt has been taking all his medications, needs Rxs for all medications and blood tests today. Pt had no major complaints\par

## 2022-07-02 ENCOUNTER — APPOINTMENT (OUTPATIENT)
Dept: FAMILY MEDICINE | Facility: CLINIC | Age: 47
End: 2022-07-02

## 2022-07-22 ENCOUNTER — APPOINTMENT (OUTPATIENT)
Dept: FAMILY MEDICINE | Facility: CLINIC | Age: 47
End: 2022-07-22

## 2022-08-20 ENCOUNTER — APPOINTMENT (OUTPATIENT)
Dept: FAMILY MEDICINE | Facility: CLINIC | Age: 47
End: 2022-08-20

## 2022-08-20 VITALS
HEART RATE: 72 BPM | HEIGHT: 66 IN | SYSTOLIC BLOOD PRESSURE: 120 MMHG | TEMPERATURE: 97.2 F | BODY MASS INDEX: 28.93 KG/M2 | DIASTOLIC BLOOD PRESSURE: 84 MMHG | OXYGEN SATURATION: 98 % | WEIGHT: 180 LBS

## 2022-08-20 DIAGNOSIS — J45.20 MILD INTERMITTENT ASTHMA, UNCOMPLICATED: ICD-10-CM

## 2022-08-20 PROCEDURE — 99212 OFFICE O/P EST SF 10 MIN: CPT

## 2022-08-20 RX ORDER — SIMVASTATIN 10 MG/1
10 TABLET, FILM COATED ORAL
Qty: 90 | Refills: 1 | Status: DISCONTINUED | COMMUNITY
Start: 2021-11-20 | End: 2022-08-20

## 2022-08-20 RX ORDER — PREDNISONE 20 MG/1
20 TABLET ORAL
Qty: 10 | Refills: 0 | Status: DISCONTINUED | COMMUNITY
Start: 2022-02-09 | End: 2022-08-20

## 2022-08-20 RX ORDER — HYDROCORTISONE 25 MG/G
2.5 CREAM TOPICAL
Qty: 2 | Refills: 3 | Status: DISCONTINUED | COMMUNITY
Start: 2022-04-04 | End: 2022-08-20

## 2022-08-20 RX ORDER — SIMETHICONE 180 MG
180 CAPSULE ORAL 4 TIMES DAILY
Qty: 120 | Refills: 3 | Status: DISCONTINUED | COMMUNITY
Start: 2022-04-04 | End: 2022-08-20

## 2022-08-20 RX ORDER — PANTOPRAZOLE 40 MG/1
40 TABLET, DELAYED RELEASE ORAL DAILY
Qty: 30 | Refills: 3 | Status: DISCONTINUED | COMMUNITY
Start: 2022-01-03 | End: 2022-08-20

## 2022-08-20 RX ORDER — SIMETHICONE 180 MG
180 CAPSULE ORAL 4 TIMES DAILY
Qty: 120 | Refills: 3 | Status: DISCONTINUED | COMMUNITY
Start: 2022-01-03 | End: 2022-08-20

## 2022-08-20 RX ORDER — DICYCLOMINE HYDROCHLORIDE 10 MG/1
10 CAPSULE ORAL 3 TIMES DAILY
Qty: 90 | Refills: 1 | Status: DISCONTINUED | COMMUNITY
Start: 2022-01-03 | End: 2022-08-20

## 2022-08-20 RX ORDER — MONTELUKAST 10 MG/1
10 TABLET, FILM COATED ORAL
Qty: 90 | Refills: 0 | Status: DISCONTINUED | COMMUNITY
Start: 2021-10-07 | End: 2022-08-20

## 2022-08-20 NOTE — HISTORY OF PRESENT ILLNESS
[de-identified] : Pt came back for followup. Pt needs all his medications refills now. Pt will return in Oct, 2022 for annual physical exam. No gout attack.

## 2022-10-15 ENCOUNTER — APPOINTMENT (OUTPATIENT)
Dept: FAMILY MEDICINE | Facility: CLINIC | Age: 47
End: 2022-10-15

## 2022-10-15 DIAGNOSIS — Z12.11 ENCOUNTER FOR SCREENING FOR MALIGNANT NEOPLASM OF COLON: ICD-10-CM

## 2022-12-02 ENCOUNTER — APPOINTMENT (OUTPATIENT)
Dept: GASTROENTEROLOGY | Facility: CLINIC | Age: 47
End: 2022-12-02

## 2023-02-04 ENCOUNTER — APPOINTMENT (OUTPATIENT)
Dept: FAMILY MEDICINE | Facility: CLINIC | Age: 48
End: 2023-02-04
Payer: COMMERCIAL

## 2023-02-04 ENCOUNTER — NON-APPOINTMENT (OUTPATIENT)
Age: 48
End: 2023-02-04

## 2023-02-04 VITALS
SYSTOLIC BLOOD PRESSURE: 133 MMHG | BODY MASS INDEX: 30.22 KG/M2 | HEART RATE: 72 BPM | TEMPERATURE: 97.6 F | WEIGHT: 188 LBS | HEIGHT: 66 IN | DIASTOLIC BLOOD PRESSURE: 89 MMHG | OXYGEN SATURATION: 97 %

## 2023-02-04 PROCEDURE — 99396 PREV VISIT EST AGE 40-64: CPT | Mod: 25

## 2023-02-04 PROCEDURE — 93000 ELECTROCARDIOGRAM COMPLETE: CPT

## 2023-02-04 PROCEDURE — 36415 COLL VENOUS BLD VENIPUNCTURE: CPT

## 2023-02-18 ENCOUNTER — APPOINTMENT (OUTPATIENT)
Dept: FAMILY MEDICINE | Facility: CLINIC | Age: 48
End: 2023-02-18
Payer: COMMERCIAL

## 2023-02-18 DIAGNOSIS — Z23 ENCOUNTER FOR IMMUNIZATION: ICD-10-CM

## 2023-02-18 PROCEDURE — 36415 COLL VENOUS BLD VENIPUNCTURE: CPT

## 2023-02-21 LAB
25(OH)D3 SERPL-MCNC: 82 NG/ML
ALBUMIN SERPL ELPH-MCNC: 4.7 G/DL
ALP BLD-CCNC: 60 U/L
ALT SERPL-CCNC: 28 U/L
ANION GAP SERPL CALC-SCNC: 13 MMOL/L
APPEARANCE: CLEAR
AST SERPL-CCNC: 27 U/L
BASOPHILS # BLD AUTO: 0.03 K/UL
BASOPHILS NFR BLD AUTO: 0.6 %
BILIRUB SERPL-MCNC: 0.4 MG/DL
BILIRUBIN URINE: NEGATIVE
BLOOD URINE: NEGATIVE
BUN SERPL-MCNC: 17 MG/DL
CALCIUM SERPL-MCNC: 9.5 MG/DL
CHLORIDE SERPL-SCNC: 103 MMOL/L
CHOLEST SERPL-MCNC: 201 MG/DL
CO2 SERPL-SCNC: 26 MMOL/L
COLOR: NORMAL
CREAT SERPL-MCNC: 1.31 MG/DL
EGFR: 67 ML/MIN/1.73M2
EOSINOPHIL # BLD AUTO: 0.37 K/UL
EOSINOPHIL NFR BLD AUTO: 7.9 %
ESTIMATED AVERAGE GLUCOSE: 128 MG/DL
GLUCOSE QUALITATIVE U: NEGATIVE
GLUCOSE SERPL-MCNC: 84 MG/DL
HBA1C MFR BLD HPLC: 6.1 %
HCT VFR BLD CALC: 48.1 %
HDLC SERPL-MCNC: 51 MG/DL
HGB BLD-MCNC: 15.1 G/DL
IMM GRANULOCYTES NFR BLD AUTO: 0.2 %
KETONES URINE: NEGATIVE
LDLC SERPL CALC-MCNC: 133 MG/DL
LEUKOCYTE ESTERASE URINE: NEGATIVE
LYMPHOCYTES # BLD AUTO: 1.93 K/UL
LYMPHOCYTES NFR BLD AUTO: 41.4 %
MAN DIFF?: NORMAL
MCHC RBC-ENTMCNC: 28.9 PG
MCHC RBC-ENTMCNC: 31.4 GM/DL
MCV RBC AUTO: 92.1 FL
MONOCYTES # BLD AUTO: 0.44 K/UL
MONOCYTES NFR BLD AUTO: 9.4 %
NEUTROPHILS # BLD AUTO: 1.88 K/UL
NEUTROPHILS NFR BLD AUTO: 40.5 %
NITRITE URINE: NEGATIVE
NONHDLC SERPL-MCNC: 149 MG/DL
PH URINE: 6
PLATELET # BLD AUTO: 150 K/UL
POTASSIUM SERPL-SCNC: 4.3 MMOL/L
PROT SERPL-MCNC: 7.3 G/DL
PROTEIN URINE: NORMAL
PSA SERPL-MCNC: 0.43 NG/ML
RBC # BLD: 5.22 M/UL
RBC # FLD: 13.4 %
SODIUM SERPL-SCNC: 142 MMOL/L
SPECIFIC GRAVITY URINE: 1.02
TRIGL SERPL-MCNC: 82 MG/DL
TSH SERPL-ACNC: 1.53 UIU/ML
URATE SERPL-MCNC: 7.5 MG/DL
UROBILINOGEN URINE: NORMAL
WBC # FLD AUTO: 4.66 K/UL

## 2023-02-22 PROBLEM — Z23 ENCOUNTER FOR IMMUNIZATION: Status: ACTIVE | Noted: 2023-02-22

## 2023-03-01 ENCOUNTER — RX RENEWAL (OUTPATIENT)
Age: 48
End: 2023-03-01

## 2023-03-01 RX ORDER — FAMOTIDINE 40 MG/1
40 TABLET, FILM COATED ORAL DAILY
Qty: 180 | Refills: 2 | Status: ACTIVE | COMMUNITY
Start: 2022-04-29 | End: 1900-01-01

## 2023-03-04 ENCOUNTER — APPOINTMENT (OUTPATIENT)
Dept: FAMILY MEDICINE | Facility: CLINIC | Age: 48
End: 2023-03-04

## 2023-04-14 ENCOUNTER — APPOINTMENT (OUTPATIENT)
Dept: FAMILY MEDICINE | Facility: CLINIC | Age: 48
End: 2023-04-14
Payer: COMMERCIAL

## 2023-04-15 ENCOUNTER — APPOINTMENT (OUTPATIENT)
Dept: FAMILY MEDICINE | Facility: CLINIC | Age: 48
End: 2023-04-15
Payer: COMMERCIAL

## 2023-04-15 VITALS
OXYGEN SATURATION: 96 % | WEIGHT: 185 LBS | HEIGHT: 66 IN | TEMPERATURE: 98 F | DIASTOLIC BLOOD PRESSURE: 79 MMHG | SYSTOLIC BLOOD PRESSURE: 133 MMHG | BODY MASS INDEX: 29.73 KG/M2 | HEART RATE: 85 BPM

## 2023-04-15 DIAGNOSIS — J45.901 UNSPECIFIED ASTHMA WITH (ACUTE) EXACERBATION: ICD-10-CM

## 2023-04-15 DIAGNOSIS — J20.9 ACUTE BRONCHITIS, UNSPECIFIED: ICD-10-CM

## 2023-04-15 PROCEDURE — 99214 OFFICE O/P EST MOD 30 MIN: CPT

## 2023-04-15 RX ORDER — ALBUTEROL SULFATE 90 UG/1
108 (90 BASE) INHALANT RESPIRATORY (INHALATION)
Qty: 1 | Refills: 3 | Status: ACTIVE | COMMUNITY
Start: 2021-10-07 | End: 1900-01-01

## 2023-04-15 NOTE — HISTORY OF PRESENT ILLNESS
[de-identified] : Pt complained coughing for 4 weeks. Pt started dry cough in 4 weeks ago, progressed to wet cough with whitish phlegm. Pt also complained left chest pain when he coughed. No fever. Pt also felt chest tightness with wheezing at night. No vomiting and diarrhea. \par Pt had blood test recently\par Creatinine was 1.31, GFR 67, total cholesterol 201, , non-HDL cholesterol 149, HBA1c 6.1, estimated glucose 128 and vit D 82.. Reports were reviewed with pt in details. Other blood tests came back wnl. \par

## 2023-04-15 NOTE — PHYSICAL EXAM
[No Respiratory Distress] : no respiratory distress  [No Accessory Muscle Use] : no accessory muscle use [Clear to Auscultation] : lungs were clear to auscultation bilaterally [Normal Percussion] : the chest was normal to percussion [de-identified] : Occasional expiratory wheezing.

## 2023-05-23 ENCOUNTER — APPOINTMENT (OUTPATIENT)
Dept: FAMILY MEDICINE | Facility: CLINIC | Age: 48
End: 2023-05-23
Payer: COMMERCIAL

## 2023-05-23 VITALS
DIASTOLIC BLOOD PRESSURE: 82 MMHG | HEART RATE: 79 BPM | OXYGEN SATURATION: 99 % | SYSTOLIC BLOOD PRESSURE: 124 MMHG | WEIGHT: 190 LBS | TEMPERATURE: 97.8 F | BODY MASS INDEX: 30.53 KG/M2 | HEIGHT: 66 IN

## 2023-05-23 PROCEDURE — 99213 OFFICE O/P EST LOW 20 MIN: CPT

## 2023-05-23 RX ORDER — ALLOPURINOL 100 MG/1
100 TABLET ORAL TWICE DAILY
Qty: 180 | Refills: 1 | Status: ACTIVE | COMMUNITY
Start: 1900-01-01 | End: 1900-01-01

## 2023-05-23 RX ORDER — METHYLPREDNISOLONE 4 MG/1
4 TABLET ORAL
Qty: 1 | Refills: 0 | Status: ACTIVE | COMMUNITY
Start: 2023-05-23 | End: 1900-01-01

## 2023-05-23 RX ORDER — PREDNISONE 20 MG/1
20 TABLET ORAL
Qty: 10 | Refills: 0 | Status: DISCONTINUED | COMMUNITY
Start: 2023-04-15 | End: 2023-05-23

## 2023-05-23 RX ORDER — AZITHROMYCIN 250 MG/1
250 TABLET, FILM COATED ORAL
Qty: 1 | Refills: 0 | Status: DISCONTINUED | COMMUNITY
Start: 2023-04-15 | End: 2023-05-23

## 2023-05-23 NOTE — HISTORY OF PRESENT ILLNESS
[FreeTextEntry8] : Pt came to office today for his gout attack now. Pt drank wine on Sunday night and woke up at 3 O'clock in the morning right right big toe severe pain, red and swelling. Pt has been taking tylenol that didn't help. Uric acid was 7.4 in April, 2023, pt takes allopurinol 100 mg daily.

## 2023-05-23 NOTE — PHYSICAL EXAM
[de-identified] : Right first metatarsophalangeal joint: severe swelling, erythematous and tenderness.

## 2023-06-03 ENCOUNTER — APPOINTMENT (OUTPATIENT)
Dept: FAMILY MEDICINE | Facility: CLINIC | Age: 48
End: 2023-06-03

## 2023-06-16 NOTE — HISTORY OF PRESENT ILLNESS
[de-identified] : Pt came back for q 3 months follow up. Pt has been taking all his medications, needs Rxs for all medications and blood tests today. Pt had no major complaints\par

## 2023-06-17 ENCOUNTER — APPOINTMENT (OUTPATIENT)
Dept: FAMILY MEDICINE | Facility: CLINIC | Age: 48
End: 2023-06-17

## 2023-10-10 ENCOUNTER — RX RENEWAL (OUTPATIENT)
Age: 48
End: 2023-10-10

## 2023-10-10 RX ORDER — SIMVASTATIN 10 MG/1
10 TABLET, FILM COATED ORAL DAILY
Qty: 90 | Refills: 1 | Status: ACTIVE | COMMUNITY
Start: 2022-08-20 | End: 1900-01-01

## 2023-11-13 PROBLEM — R10.12 ABDOMINAL PAIN, ACUTE, LEFT UPPER QUADRANT: Status: RESOLVED | Noted: 2022-01-03 | Resolved: 2023-11-12

## 2023-11-13 PROBLEM — R79.89 ELEVATED SERUM CREATININE: Status: ACTIVE | Noted: 2023-03-01

## 2023-11-15 ENCOUNTER — EMERGENCY (EMERGENCY)
Facility: HOSPITAL | Age: 48
LOS: 1 days | Discharge: HOME | End: 2023-11-15
Attending: STUDENT IN AN ORGANIZED HEALTH CARE EDUCATION/TRAINING PROGRAM
Payer: COMMERCIAL

## 2023-11-15 VITALS
WEIGHT: 197.98 LBS | HEART RATE: 81 BPM | DIASTOLIC BLOOD PRESSURE: 86 MMHG | SYSTOLIC BLOOD PRESSURE: 130 MMHG | RESPIRATION RATE: 18 BRPM | OXYGEN SATURATION: 96 % | TEMPERATURE: 98 F

## 2023-11-15 PROCEDURE — 99285 EMERGENCY DEPT VISIT HI MDM: CPT

## 2023-11-15 PROCEDURE — 71046 X-RAY EXAM CHEST 2 VIEWS: CPT | Mod: 26

## 2023-11-15 RX ORDER — KETOROLAC TROMETHAMINE 30 MG/ML
15 SYRINGE (ML) INJECTION ONCE
Refills: 0 | Status: DISCONTINUED | OUTPATIENT
Start: 2023-11-15 | End: 2023-11-15

## 2023-11-15 NOTE — ED PROVIDER NOTE - PATIENT PORTAL LINK FT
You can access the FollowMyHealth Patient Portal offered by [facility] by registering at the following website: [email or web address]. By joining [employer]’s FollowMyHealth portal, you will also be able to view your health information using other applications (apps) compatible with our system.

## 2023-11-15 NOTE — ED ADULT NURSE NOTE - OBJECTIVE STATEMENT
No
Pt presents to the ED verbalizing with that he recently removed from [facility] with c/o of right rib pain s/p coughing

## 2023-11-15 NOTE — ED PROVIDER NOTE - CLINICAL SUMMARY MEDICAL DECISION MAKING FREE TEXT BOX
48-year-old male hx of asthma (never hospitalized or admitted), HLD, presenting with R mid axillary area pain since sneezing earlier today. Differential includes pneumothorax vs pulled muscle, lower suspicion for ACS. Will check CXR, labs, ECG, provide pain meds and reassess.

## 2023-11-15 NOTE — ED ADULT NURSE NOTE - NSFALLUNIVINTERV_ED_ALL_ED
Bed/Stretcher in lowest position, wheels locked, appropriate side rails in place/Call bell, personal items and telephone in reach/Instruct patient to call for assistance before getting out of bed/chair/stretcher/Non-slip footwear applied when patient is off stretcher/[place] to call system/Physically safe environment - no spills, clutter or unnecessary equipment/Purposeful proactive rounding/Room/bathroom lighting operational, light cord in reach

## 2023-11-15 NOTE — ED ADULT TRIAGE NOTE - CHIEF COMPLAINT QUOTE
pain right side mid axillary area , heard a pop while walking home today, was diagnose with walking Pneumonia a week ago and on Prednisone and doxy

## 2023-11-15 NOTE — ED PROVIDER NOTE - NSFOLLOWUPINSTRUCTIONS_ED_ALL_ED_FT
You were seen in the emergency department for: chest wall pain  Your diagnosis for this visit was: possibly pulled muscle - your results report is attached and everything was normal, including your electrocardiogram  For your pain: Please take Tylenol 1000mg every 6 hours as needed or Ibuprofen 600mg every 6 hours as needed - you can alternate every 3 hours as needed.   We recommend you follow up with: your primary care doctor.    Please return to the Emergency Department if you experience any of the following symptoms:   - Shortness of breath or trouble breathing  - Pressure, pain or tightness in the chest  - Face drooping, arm weakness or speech difficulty  - Persistence of severe vomiting  - Head injury or loss of consciousness  - Nonstop bleeding or an open wound    (1) Follow up with your primary care physician within the next 24-48 hours as discussed. In addition, we did not find evidence of a life threatening illness on your testing here today, but listed below are the specialists that will be necessary to see as an outpatient to continue the workup.  Please call the numbers listed below or [phone] to set up the necessary appointments.  (2) Take Tylenol (up to 1000mg or 1 g)  and/or Motrin (up to 600mg) up to every 6 hours as needed for pain.   (3) If you had an IV (intravenous) line placed, it was removed. Sometimes, after IV removal, that area can be tender for a few days; if it develops redness and swelling, those could be signs of infection; in which case, return to the Emergency Department for assessment.  (4) Please continue taking all of your home medications as directed.

## 2023-11-15 NOTE — ED PROVIDER NOTE - HEME LYMPH, MLM
Faxed referral to Dr. Crespo.   No adenopathy or splenomegaly. No cervical or inguinal lymphadenopathy.

## 2023-11-15 NOTE — ED PROVIDER NOTE - CARDIAC, MLM
Normal rate, regular rhythm.  Heart sounds S1, S2.  No murmurs, rubs or gallops. +R chest/mid axillary wall TTP.

## 2023-11-16 VITALS
SYSTOLIC BLOOD PRESSURE: 115 MMHG | DIASTOLIC BLOOD PRESSURE: 81 MMHG | RESPIRATION RATE: 17 BRPM | HEART RATE: 85 BPM | OXYGEN SATURATION: 97 %

## 2023-11-16 LAB
ALBUMIN SERPL ELPH-MCNC: 3.6 G/DL — SIGNIFICANT CHANGE UP (ref 3.5–5)
ALBUMIN SERPL ELPH-MCNC: 3.6 G/DL — SIGNIFICANT CHANGE UP (ref 3.5–5)
ALP SERPL-CCNC: 106 U/L — SIGNIFICANT CHANGE UP (ref 40–120)
ALP SERPL-CCNC: 106 U/L — SIGNIFICANT CHANGE UP (ref 40–120)
ALT FLD-CCNC: 40 U/L DA — SIGNIFICANT CHANGE UP (ref 10–60)
ALT FLD-CCNC: 40 U/L DA — SIGNIFICANT CHANGE UP (ref 10–60)
ANION GAP SERPL CALC-SCNC: 4 MMOL/L — LOW (ref 5–17)
ANION GAP SERPL CALC-SCNC: 4 MMOL/L — LOW (ref 5–17)
AST SERPL-CCNC: 30 U/L — SIGNIFICANT CHANGE UP (ref 10–40)
AST SERPL-CCNC: 30 U/L — SIGNIFICANT CHANGE UP (ref 10–40)
BASOPHILS # BLD AUTO: 0.03 K/UL — SIGNIFICANT CHANGE UP (ref 0–0.2)
BASOPHILS # BLD AUTO: 0.03 K/UL — SIGNIFICANT CHANGE UP (ref 0–0.2)
BASOPHILS NFR BLD AUTO: 0.4 % — SIGNIFICANT CHANGE UP (ref 0–2)
BASOPHILS NFR BLD AUTO: 0.4 % — SIGNIFICANT CHANGE UP (ref 0–2)
BILIRUB SERPL-MCNC: 0.4 MG/DL — SIGNIFICANT CHANGE UP (ref 0.2–1.2)
BILIRUB SERPL-MCNC: 0.4 MG/DL — SIGNIFICANT CHANGE UP (ref 0.2–1.2)
BUN SERPL-MCNC: 23 MG/DL — HIGH (ref 7–18)
BUN SERPL-MCNC: 23 MG/DL — HIGH (ref 7–18)
CALCIUM SERPL-MCNC: 8.9 MG/DL — SIGNIFICANT CHANGE UP (ref 8.4–10.5)
CALCIUM SERPL-MCNC: 8.9 MG/DL — SIGNIFICANT CHANGE UP (ref 8.4–10.5)
CHLORIDE SERPL-SCNC: 107 MMOL/L — SIGNIFICANT CHANGE UP (ref 96–108)
CHLORIDE SERPL-SCNC: 107 MMOL/L — SIGNIFICANT CHANGE UP (ref 96–108)
CO2 SERPL-SCNC: 27 MMOL/L — SIGNIFICANT CHANGE UP (ref 22–31)
CO2 SERPL-SCNC: 27 MMOL/L — SIGNIFICANT CHANGE UP (ref 22–31)
CREAT SERPL-MCNC: 1.35 MG/DL — HIGH (ref 0.5–1.3)
CREAT SERPL-MCNC: 1.35 MG/DL — HIGH (ref 0.5–1.3)
EGFR: 64 ML/MIN/1.73M2 — SIGNIFICANT CHANGE UP
EGFR: 64 ML/MIN/1.73M2 — SIGNIFICANT CHANGE UP
EOSINOPHIL # BLD AUTO: 0.43 K/UL — SIGNIFICANT CHANGE UP (ref 0–0.5)
EOSINOPHIL # BLD AUTO: 0.43 K/UL — SIGNIFICANT CHANGE UP (ref 0–0.5)
EOSINOPHIL NFR BLD AUTO: 6.1 % — HIGH (ref 0–6)
EOSINOPHIL NFR BLD AUTO: 6.1 % — HIGH (ref 0–6)
GLUCOSE SERPL-MCNC: 108 MG/DL — HIGH (ref 70–99)
GLUCOSE SERPL-MCNC: 108 MG/DL — HIGH (ref 70–99)
HCT VFR BLD CALC: 45 % — SIGNIFICANT CHANGE UP (ref 39–50)
HCT VFR BLD CALC: 45 % — SIGNIFICANT CHANGE UP (ref 39–50)
HGB BLD-MCNC: 15.1 G/DL — SIGNIFICANT CHANGE UP (ref 13–17)
HGB BLD-MCNC: 15.1 G/DL — SIGNIFICANT CHANGE UP (ref 13–17)
IMM GRANULOCYTES NFR BLD AUTO: 0.3 % — SIGNIFICANT CHANGE UP (ref 0–0.9)
IMM GRANULOCYTES NFR BLD AUTO: 0.3 % — SIGNIFICANT CHANGE UP (ref 0–0.9)
LYMPHOCYTES # BLD AUTO: 3.02 K/UL — SIGNIFICANT CHANGE UP (ref 1–3.3)
LYMPHOCYTES # BLD AUTO: 3.02 K/UL — SIGNIFICANT CHANGE UP (ref 1–3.3)
LYMPHOCYTES # BLD AUTO: 42.7 % — SIGNIFICANT CHANGE UP (ref 13–44)
LYMPHOCYTES # BLD AUTO: 42.7 % — SIGNIFICANT CHANGE UP (ref 13–44)
MCHC RBC-ENTMCNC: 29.3 PG — SIGNIFICANT CHANGE UP (ref 27–34)
MCHC RBC-ENTMCNC: 29.3 PG — SIGNIFICANT CHANGE UP (ref 27–34)
MCHC RBC-ENTMCNC: 33.6 GM/DL — SIGNIFICANT CHANGE UP (ref 32–36)
MCHC RBC-ENTMCNC: 33.6 GM/DL — SIGNIFICANT CHANGE UP (ref 32–36)
MCV RBC AUTO: 87.4 FL — SIGNIFICANT CHANGE UP (ref 80–100)
MCV RBC AUTO: 87.4 FL — SIGNIFICANT CHANGE UP (ref 80–100)
MONOCYTES # BLD AUTO: 0.71 K/UL — SIGNIFICANT CHANGE UP (ref 0–0.9)
MONOCYTES # BLD AUTO: 0.71 K/UL — SIGNIFICANT CHANGE UP (ref 0–0.9)
MONOCYTES NFR BLD AUTO: 10 % — SIGNIFICANT CHANGE UP (ref 2–14)
MONOCYTES NFR BLD AUTO: 10 % — SIGNIFICANT CHANGE UP (ref 2–14)
NEUTROPHILS # BLD AUTO: 2.87 K/UL — SIGNIFICANT CHANGE UP (ref 1.8–7.4)
NEUTROPHILS # BLD AUTO: 2.87 K/UL — SIGNIFICANT CHANGE UP (ref 1.8–7.4)
NEUTROPHILS NFR BLD AUTO: 40.5 % — LOW (ref 43–77)
NEUTROPHILS NFR BLD AUTO: 40.5 % — LOW (ref 43–77)
NRBC # BLD: 0 /100 WBCS — SIGNIFICANT CHANGE UP (ref 0–0)
NRBC # BLD: 0 /100 WBCS — SIGNIFICANT CHANGE UP (ref 0–0)
PLATELET # BLD AUTO: 173 K/UL — SIGNIFICANT CHANGE UP (ref 150–400)
PLATELET # BLD AUTO: 173 K/UL — SIGNIFICANT CHANGE UP (ref 150–400)
POTASSIUM SERPL-MCNC: 4 MMOL/L — SIGNIFICANT CHANGE UP (ref 3.5–5.3)
POTASSIUM SERPL-MCNC: 4 MMOL/L — SIGNIFICANT CHANGE UP (ref 3.5–5.3)
POTASSIUM SERPL-SCNC: 4 MMOL/L — SIGNIFICANT CHANGE UP (ref 3.5–5.3)
POTASSIUM SERPL-SCNC: 4 MMOL/L — SIGNIFICANT CHANGE UP (ref 3.5–5.3)
PROT SERPL-MCNC: 7.3 G/DL — SIGNIFICANT CHANGE UP (ref 6–8.3)
PROT SERPL-MCNC: 7.3 G/DL — SIGNIFICANT CHANGE UP (ref 6–8.3)
RBC # BLD: 5.15 M/UL — SIGNIFICANT CHANGE UP (ref 4.2–5.8)
RBC # BLD: 5.15 M/UL — SIGNIFICANT CHANGE UP (ref 4.2–5.8)
RBC # FLD: 12.3 % — SIGNIFICANT CHANGE UP (ref 10.3–14.5)
RBC # FLD: 12.3 % — SIGNIFICANT CHANGE UP (ref 10.3–14.5)
SODIUM SERPL-SCNC: 138 MMOL/L — SIGNIFICANT CHANGE UP (ref 135–145)
SODIUM SERPL-SCNC: 138 MMOL/L — SIGNIFICANT CHANGE UP (ref 135–145)
TROPONIN I, HIGH SENSITIVITY RESULT: 5.5 NG/L — SIGNIFICANT CHANGE UP
TROPONIN I, HIGH SENSITIVITY RESULT: 5.5 NG/L — SIGNIFICANT CHANGE UP
WBC # BLD: 7.08 K/UL — SIGNIFICANT CHANGE UP (ref 3.8–10.5)
WBC # BLD: 7.08 K/UL — SIGNIFICANT CHANGE UP (ref 3.8–10.5)
WBC # FLD AUTO: 7.08 K/UL — SIGNIFICANT CHANGE UP (ref 3.8–10.5)
WBC # FLD AUTO: 7.08 K/UL — SIGNIFICANT CHANGE UP (ref 3.8–10.5)

## 2023-11-16 PROCEDURE — 96374 THER/PROPH/DIAG INJ IV PUSH: CPT

## 2023-11-16 PROCEDURE — 93010 ELECTROCARDIOGRAM REPORT: CPT

## 2023-11-16 PROCEDURE — 85025 COMPLETE CBC W/AUTO DIFF WBC: CPT

## 2023-11-16 PROCEDURE — 36415 COLL VENOUS BLD VENIPUNCTURE: CPT

## 2023-11-16 PROCEDURE — 93005 ELECTROCARDIOGRAM TRACING: CPT

## 2023-11-16 PROCEDURE — 84484 ASSAY OF TROPONIN QUANT: CPT

## 2023-11-16 PROCEDURE — 80053 COMPREHEN METABOLIC PANEL: CPT

## 2023-11-16 PROCEDURE — 71046 X-RAY EXAM CHEST 2 VIEWS: CPT

## 2023-11-16 PROCEDURE — 99285 EMERGENCY DEPT VISIT HI MDM: CPT | Mod: 25

## 2023-11-16 RX ADMIN — Medication 15 MILLIGRAM(S): at 01:33

## 2023-11-18 ENCOUNTER — APPOINTMENT (OUTPATIENT)
Dept: FAMILY MEDICINE | Facility: CLINIC | Age: 48
End: 2023-11-18

## 2023-11-18 DIAGNOSIS — R10.12 LEFT UPPER QUADRANT PAIN: ICD-10-CM

## 2023-11-18 DIAGNOSIS — R79.89 OTHER SPECIFIED ABNORMAL FINDINGS OF BLOOD CHEMISTRY: ICD-10-CM

## 2023-11-26 ENCOUNTER — RX RENEWAL (OUTPATIENT)
Age: 48
End: 2023-11-26

## 2023-11-26 RX ORDER — MONTELUKAST 10 MG/1
10 TABLET, FILM COATED ORAL DAILY
Qty: 90 | Refills: 1 | Status: ACTIVE | COMMUNITY
Start: 2022-08-20 | End: 1900-01-01

## 2023-12-17 ENCOUNTER — NON-APPOINTMENT (OUTPATIENT)
Age: 48
End: 2023-12-17

## 2023-12-23 ENCOUNTER — APPOINTMENT (OUTPATIENT)
Dept: FAMILY MEDICINE | Facility: CLINIC | Age: 48
End: 2023-12-23
Payer: COMMERCIAL

## 2023-12-23 VITALS
BODY MASS INDEX: 31.5 KG/M2 | HEART RATE: 86 BPM | TEMPERATURE: 97.8 F | SYSTOLIC BLOOD PRESSURE: 142 MMHG | OXYGEN SATURATION: 96 % | RESPIRATION RATE: 14 BRPM | DIASTOLIC BLOOD PRESSURE: 95 MMHG | HEIGHT: 66 IN | WEIGHT: 196 LBS

## 2023-12-23 DIAGNOSIS — T78.40XS ALLERGY, UNSPECIFIED, SEQUELA: ICD-10-CM

## 2023-12-23 DIAGNOSIS — J45.31 MILD PERSISTENT ASTHMA WITH (ACUTE) EXACERBATION: ICD-10-CM

## 2023-12-23 DIAGNOSIS — J45.40 MODERATE PERSISTENT ASTHMA, UNCOMPLICATED: ICD-10-CM

## 2023-12-23 PROCEDURE — 99213 OFFICE O/P EST LOW 20 MIN: CPT

## 2023-12-23 NOTE — REVIEW OF SYSTEMS
[Shortness Of Breath] : shortness of breath [Cough] : cough [Negative] : Psychiatric [Wheezing] : no wheezing [Dyspnea on Exertion] : not dyspnea on exertion

## 2023-12-23 NOTE — HISTORY OF PRESENT ILLNESS
[de-identified] : Patient is a 48 year old M with a PMHx of Mild intermittent asthma coming in for two and half weeks of cough, intermittent SOB, and nasal congestion.  Patient reports that his children were sick recently, patient had covid-19 and influenza testing done on 12/18 which was negative.  Patient reports increased use of his albuterol inhaler.  Patient reports using his albuterol inhaler once or twice a week during the daytime.  Patient reports nighttime use every other night.  Patient reports his symptoms are mostly improving but still present.  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  Patient denies chest pain, SOB, nausea, vomiting, or fever at this time.

## 2024-01-27 ENCOUNTER — RX RENEWAL (OUTPATIENT)
Age: 49
End: 2024-01-27

## 2024-01-27 RX ORDER — BUDESONIDE AND FORMOTEROL FUMARATE DIHYDRATE 80; 4.5 UG/1; UG/1
80-4.5 AEROSOL RESPIRATORY (INHALATION)
Qty: 1 | Refills: 1 | Status: ACTIVE | COMMUNITY
Start: 2023-12-23 | End: 1900-01-01

## 2024-02-24 PROBLEM — E67.3 HIGH VITAMIN D LEVEL: Status: ACTIVE | Noted: 2023-03-01

## 2024-02-24 PROBLEM — R03.0 ELEVATED BLOOD PRESSURE READING: Status: ACTIVE | Noted: 2023-02-04

## 2024-03-02 ENCOUNTER — APPOINTMENT (OUTPATIENT)
Dept: FAMILY MEDICINE | Facility: CLINIC | Age: 49
End: 2024-03-02

## 2024-03-02 DIAGNOSIS — R03.0 ELEVATED BLOOD-PRESSURE READING, W/OUT DIAGNOSIS OF HYPERTENSION: ICD-10-CM

## 2024-03-02 DIAGNOSIS — E67.3 HYPERVITAMINOSIS D: ICD-10-CM

## 2024-06-21 PROBLEM — E66.9 OBESITY: Status: ACTIVE | Noted: 2023-02-04

## 2024-06-21 PROBLEM — Z00.01 ENCOUNTER FOR WELL ADULT EXAM WITH ABNORMAL FINDINGS: Status: ACTIVE | Noted: 2022-10-14

## 2024-06-21 PROBLEM — R73.09 ABNORMAL GLUCOSE: Status: ACTIVE | Noted: 2022-03-04

## 2024-06-21 PROBLEM — J45.30 MILD PERSISTENT ASTHMA: Status: ACTIVE | Noted: 2022-08-20

## 2024-06-21 PROBLEM — K21.9 GERD WITHOUT ESOPHAGITIS: Status: ACTIVE | Noted: 2020-08-14

## 2024-06-21 PROBLEM — M10.00 IDIOPATHIC GOUT, UNSPECIFIED SITE: Status: ACTIVE | Noted: 2022-02-25

## 2024-06-21 PROBLEM — E78.5 HYPERLIPIDEMIA: Status: ACTIVE | Noted: 2022-02-25

## 2024-06-21 NOTE — HISTORY OF PRESENT ILLNESS
[de-identified] : 48 years old male has past medical history of prediabetes, hyperlipidemia, gout, asthma, GERD, and obesity, came back for annual physical exam

## 2024-06-27 ENCOUNTER — APPOINTMENT (OUTPATIENT)
Dept: FAMILY MEDICINE | Facility: CLINIC | Age: 49
End: 2024-06-27

## 2024-06-27 DIAGNOSIS — K21.9 GASTRO-ESOPHAGEAL REFLUX DISEASE W/OUT ESOPHAGITIS: ICD-10-CM

## 2024-06-27 DIAGNOSIS — J45.30 MILD PERSISTENT ASTHMA, UNCOMPLICATED: ICD-10-CM

## 2024-06-27 DIAGNOSIS — E66.9 OBESITY, UNSPECIFIED: ICD-10-CM

## 2024-06-27 DIAGNOSIS — Z00.01 ENCOUNTER FOR GENERAL ADULT MEDICAL EXAMINATION WITH ABNORMAL FINDINGS: ICD-10-CM

## 2024-06-27 DIAGNOSIS — R73.09 OTHER ABNORMAL GLUCOSE: ICD-10-CM

## 2024-06-27 DIAGNOSIS — M10.00 IDIOPATHIC GOUT, UNSPECIFIED SITE: ICD-10-CM

## 2024-06-27 DIAGNOSIS — E78.5 HYPERLIPIDEMIA, UNSPECIFIED: ICD-10-CM

## 2024-08-06 NOTE — CHART NOTE - NSREFPHYEXREFTO_GEN_ALL_CORE
Patient presents to ED for c/o hx of difficulty swallowing pills. Patient states she got a new GI doctor, had endoscopy last Monday and stretched her esophaus, noticed mucosal tears, repaired a z-line and said to come back in 6 months in any issues. Patient states the following day, she was having issues/pain but was drinking warm tea to try to help with symptoms. Patient states Friday she reached out to the doctor and they told her they wanted to do another test where they put a tube down her nose. Patient states Saturday she ate, chewed food well, and felt good. Patient states last night she ate a small portion of stir raygoza, it feels like its stuck and feels like she has to keep belching. Patient reports hx of thyroid issues as well, states she cannot follow up until September. Patient denies SOB or difficulty breathing.    Other

## 2025-02-15 ENCOUNTER — APPOINTMENT (OUTPATIENT)
Dept: FAMILY MEDICINE | Facility: CLINIC | Age: 50
End: 2025-02-15

## 2025-02-15 DIAGNOSIS — Z12.11 ENCOUNTER FOR SCREENING FOR MALIGNANT NEOPLASM OF COLON: ICD-10-CM

## 2025-02-15 DIAGNOSIS — M10.00 IDIOPATHIC GOUT, UNSPECIFIED SITE: ICD-10-CM

## 2025-02-15 DIAGNOSIS — Z00.01 ENCOUNTER FOR GENERAL ADULT MEDICAL EXAMINATION WITH ABNORMAL FINDINGS: ICD-10-CM

## 2025-02-15 DIAGNOSIS — E66.9 OBESITY, UNSPECIFIED: ICD-10-CM

## 2025-02-15 DIAGNOSIS — R73.09 OTHER ABNORMAL GLUCOSE: ICD-10-CM

## 2025-02-15 DIAGNOSIS — J45.30 MILD PERSISTENT ASTHMA, UNCOMPLICATED: ICD-10-CM

## 2025-02-15 DIAGNOSIS — K21.9 GASTRO-ESOPHAGEAL REFLUX DISEASE W/OUT ESOPHAGITIS: ICD-10-CM

## 2025-02-15 DIAGNOSIS — E78.5 HYPERLIPIDEMIA, UNSPECIFIED: ICD-10-CM

## 2025-04-21 ENCOUNTER — NON-APPOINTMENT (OUTPATIENT)
Age: 50
End: 2025-04-21

## 2025-05-03 ENCOUNTER — NON-APPOINTMENT (OUTPATIENT)
Age: 50
End: 2025-05-03

## 2025-05-03 ENCOUNTER — APPOINTMENT (OUTPATIENT)
Dept: FAMILY MEDICINE | Facility: CLINIC | Age: 50
End: 2025-05-03
Payer: COMMERCIAL

## 2025-05-03 VITALS
BODY MASS INDEX: 32.3 KG/M2 | DIASTOLIC BLOOD PRESSURE: 89 MMHG | OXYGEN SATURATION: 98 % | SYSTOLIC BLOOD PRESSURE: 130 MMHG | HEART RATE: 75 BPM | WEIGHT: 201 LBS | HEIGHT: 66 IN | TEMPERATURE: 98.3 F

## 2025-05-03 DIAGNOSIS — E66.9 OBESITY, UNSPECIFIED: ICD-10-CM

## 2025-05-03 DIAGNOSIS — Z00.01 ENCOUNTER FOR GENERAL ADULT MEDICAL EXAMINATION WITH ABNORMAL FINDINGS: ICD-10-CM

## 2025-05-03 DIAGNOSIS — R73.09 OTHER ABNORMAL GLUCOSE: ICD-10-CM

## 2025-05-03 DIAGNOSIS — M10.00 IDIOPATHIC GOUT, UNSPECIFIED SITE: ICD-10-CM

## 2025-05-03 DIAGNOSIS — K21.9 GASTRO-ESOPHAGEAL REFLUX DISEASE W/OUT ESOPHAGITIS: ICD-10-CM

## 2025-05-03 DIAGNOSIS — J45.30 MILD PERSISTENT ASTHMA, UNCOMPLICATED: ICD-10-CM

## 2025-05-03 DIAGNOSIS — Z12.11 ENCOUNTER FOR SCREENING FOR MALIGNANT NEOPLASM OF COLON: ICD-10-CM

## 2025-05-03 DIAGNOSIS — E78.5 HYPERLIPIDEMIA, UNSPECIFIED: ICD-10-CM

## 2025-05-03 LAB
25(OH)D3 SERPL-MCNC: 47.6 NG/ML
ALBUMIN SERPL ELPH-MCNC: 4.5 G/DL
ALP BLD-CCNC: 78 U/L
ALT SERPL-CCNC: 23 U/L
ANION GAP SERPL CALC-SCNC: 14 MMOL/L
AST SERPL-CCNC: 25 U/L
BASOPHILS # BLD AUTO: 0.04 K/UL
BASOPHILS NFR BLD AUTO: 0.8 %
BILIRUB SERPL-MCNC: 0.4 MG/DL
BUN SERPL-MCNC: 19 MG/DL
CALCIUM SERPL-MCNC: 9.3 MG/DL
CHLORIDE SERPL-SCNC: 105 MMOL/L
CHOLEST SERPL-MCNC: 292 MG/DL
CO2 SERPL-SCNC: 22 MMOL/L
CREAT SERPL-MCNC: 1.45 MG/DL
EGFRCR SERPLBLD CKD-EPI 2021: 58 ML/MIN/1.73M2
EOSINOPHIL # BLD AUTO: 0.3 K/UL
EOSINOPHIL NFR BLD AUTO: 5.7 %
ESTIMATED AVERAGE GLUCOSE: 134 MG/DL
GLUCOSE SERPL-MCNC: 106 MG/DL
HBA1C MFR BLD HPLC: 6.3 %
HCT VFR BLD CALC: 43 %
HDLC SERPL-MCNC: 44 MG/DL
HGB BLD-MCNC: 14 G/DL
IMM GRANULOCYTES NFR BLD AUTO: 0.2 %
LDLC SERPL-MCNC: 216 MG/DL
LYMPHOCYTES # BLD AUTO: 1.72 K/UL
LYMPHOCYTES NFR BLD AUTO: 32.6 %
MAN DIFF?: NORMAL
MCHC RBC-ENTMCNC: 29.2 PG
MCHC RBC-ENTMCNC: 32.6 G/DL
MCV RBC AUTO: 89.8 FL
MONOCYTES # BLD AUTO: 0.43 K/UL
MONOCYTES NFR BLD AUTO: 8.1 %
NEUTROPHILS # BLD AUTO: 2.78 K/UL
NEUTROPHILS NFR BLD AUTO: 52.6 %
NONHDLC SERPL-MCNC: 247 MG/DL
PLATELET # BLD AUTO: 146 K/UL
POTASSIUM SERPL-SCNC: 4.7 MMOL/L
PROT SERPL-MCNC: 7.1 G/DL
PSA SERPL-MCNC: 0.49 NG/ML
RBC # BLD: 4.79 M/UL
RBC # FLD: 12.7 %
SODIUM SERPL-SCNC: 142 MMOL/L
TRIGL SERPL-MCNC: 166 MG/DL
TSH SERPL-ACNC: 1.5 UIU/ML
URATE SERPL-MCNC: 10.9 MG/DL
WBC # FLD AUTO: 5.28 K/UL

## 2025-05-03 PROCEDURE — 36415 COLL VENOUS BLD VENIPUNCTURE: CPT

## 2025-05-03 PROCEDURE — 93000 ELECTROCARDIOGRAM COMPLETE: CPT

## 2025-05-03 PROCEDURE — 99396 PREV VISIT EST AGE 40-64: CPT

## 2025-05-04 LAB
APPEARANCE: CLEAR
BILIRUBIN URINE: NEGATIVE
BLOOD URINE: NEGATIVE
COLOR: YELLOW
GLUCOSE QUALITATIVE U: NEGATIVE MG/DL
KETONES URINE: NEGATIVE MG/DL
LEUKOCYTE ESTERASE URINE: NEGATIVE
NITRITE URINE: NEGATIVE
PH URINE: 6
PROTEIN URINE: NEGATIVE MG/DL
SPECIFIC GRAVITY URINE: 1.02
UROBILINOGEN URINE: 0.2 MG/DL

## 2025-05-06 ENCOUNTER — NON-APPOINTMENT (OUTPATIENT)
Age: 50
End: 2025-05-06

## 2025-05-24 PROBLEM — N28.9 ABNORMAL KIDNEY FUNCTION: Status: ACTIVE | Noted: 2025-05-24

## 2025-05-24 PROBLEM — R73.03 PREDIABETES: Status: ACTIVE | Noted: 2025-05-24

## 2025-05-31 ENCOUNTER — APPOINTMENT (OUTPATIENT)
Dept: FAMILY MEDICINE | Facility: CLINIC | Age: 50
End: 2025-05-31

## 2025-05-31 DIAGNOSIS — N28.9 DISORDER OF KIDNEY AND URETER, UNSPECIFIED: ICD-10-CM

## 2025-05-31 DIAGNOSIS — R73.03 PREDIABETES.: ICD-10-CM

## 2025-05-31 DIAGNOSIS — M10.00 IDIOPATHIC GOUT, UNSPECIFIED SITE: ICD-10-CM

## 2025-05-31 DIAGNOSIS — E78.5 HYPERLIPIDEMIA, UNSPECIFIED: ICD-10-CM

## 2025-09-06 ENCOUNTER — APPOINTMENT (OUTPATIENT)
Dept: FAMILY MEDICINE | Facility: CLINIC | Age: 50
End: 2025-09-06
Payer: COMMERCIAL

## 2025-09-06 VITALS
WEIGHT: 200 LBS | TEMPERATURE: 97.5 F | HEART RATE: 90 BPM | SYSTOLIC BLOOD PRESSURE: 131 MMHG | HEIGHT: 66 IN | BODY MASS INDEX: 32.14 KG/M2 | DIASTOLIC BLOOD PRESSURE: 87 MMHG | OXYGEN SATURATION: 96 %

## 2025-09-06 DIAGNOSIS — N28.9 DISORDER OF KIDNEY AND URETER, UNSPECIFIED: ICD-10-CM

## 2025-09-06 DIAGNOSIS — M10.00 IDIOPATHIC GOUT, UNSPECIFIED SITE: ICD-10-CM

## 2025-09-06 DIAGNOSIS — R73.09 OTHER ABNORMAL GLUCOSE: ICD-10-CM

## 2025-09-06 DIAGNOSIS — J45.30 MILD PERSISTENT ASTHMA, UNCOMPLICATED: ICD-10-CM

## 2025-09-06 DIAGNOSIS — R73.03 PREDIABETES.: ICD-10-CM

## 2025-09-06 DIAGNOSIS — R14.0 ABDOMINAL DISTENSION (GASEOUS): ICD-10-CM

## 2025-09-06 DIAGNOSIS — K21.9 GASTRO-ESOPHAGEAL REFLUX DISEASE W/OUT ESOPHAGITIS: ICD-10-CM

## 2025-09-06 DIAGNOSIS — E78.5 HYPERLIPIDEMIA, UNSPECIFIED: ICD-10-CM

## 2025-09-06 PROCEDURE — 99214 OFFICE O/P EST MOD 30 MIN: CPT

## 2025-09-06 RX ORDER — ATORVASTATIN CALCIUM 10 MG/1
10 TABLET, FILM COATED ORAL DAILY
Qty: 90 | Refills: 1 | Status: ACTIVE | COMMUNITY
Start: 2025-09-06 | End: 1900-01-01